# Patient Record
Sex: FEMALE | Race: WHITE | Employment: OTHER | URBAN - METROPOLITAN AREA
[De-identification: names, ages, dates, MRNs, and addresses within clinical notes are randomized per-mention and may not be internally consistent; named-entity substitution may affect disease eponyms.]

---

## 2016-10-27 LAB — GLUCOSE BLD-MCNC: 148 MG/DL

## 2016-10-31 LAB
A/G RATIO: 2.2
ALBUMIN SERPL-MCNC: 4.8 G/DL
ALP BLD-CCNC: 104 U/L
ALT SERPL-CCNC: 17 U/L
AST SERPL-CCNC: 18 U/L
BILIRUB SERPL-MCNC: 0.4 MG/DL (ref 0.1–1.4)
BILIRUBIN DIRECT: 0.1 MG/DL
BILIRUBIN, INDIRECT: 0.3
GLOBULIN: 2.2
PROTEIN TOTAL: 7 G/DL

## 2017-12-19 LAB
BUN BLDV-MCNC: 18 MG/DL
BUN BLDV-MCNC: 18 MG/DL
CALCIUM SERPL-MCNC: 9.9 MG/DL
CALCIUM SERPL-MCNC: 9.9 MG/DL
CHLORIDE BLD-SCNC: 94 MMOL/L
CHLORIDE BLD-SCNC: 94 MMOL/L
CO2: 25 MMOL/L
CO2: 25 MMOL/L
CREAT SERPL-MCNC: 0.8 MG/DL
CREAT SERPL-MCNC: 0.8 MG/DL
GFR CALCULATED: 73
GFR CALCULATED: 73
GLUCOSE BLD-MCNC: 206 MG/DL
GLUCOSE BLD-MCNC: 206 MG/DL
POTASSIUM SERPL-SCNC: 4.3 MMOL/L
POTASSIUM SERPL-SCNC: 4.3 MMOL/L
SODIUM BLD-SCNC: 137 MMOL/L
SODIUM BLD-SCNC: 137 MMOL/L

## 2018-03-19 ENCOUNTER — OFFICE VISIT (OUTPATIENT)
Dept: FAMILY MEDICINE CLINIC | Age: 73
End: 2018-03-19

## 2018-03-19 VITALS
OXYGEN SATURATION: 98 % | DIASTOLIC BLOOD PRESSURE: 82 MMHG | HEART RATE: 71 BPM | SYSTOLIC BLOOD PRESSURE: 136 MMHG | BODY MASS INDEX: 30.29 KG/M2 | HEIGHT: 61 IN | WEIGHT: 160.4 LBS

## 2018-03-19 DIAGNOSIS — F41.9 ANXIETY: ICD-10-CM

## 2018-03-19 DIAGNOSIS — E11.9 TYPE 2 DIABETES MELLITUS WITHOUT COMPLICATION, WITHOUT LONG-TERM CURRENT USE OF INSULIN (HCC): ICD-10-CM

## 2018-03-19 DIAGNOSIS — Z76.89 ENCOUNTER TO ESTABLISH CARE: ICD-10-CM

## 2018-03-19 DIAGNOSIS — I10 ESSENTIAL HYPERTENSION: Primary | ICD-10-CM

## 2018-03-19 PROCEDURE — 99203 OFFICE O/P NEW LOW 30 MIN: CPT | Performed by: FAMILY MEDICINE

## 2018-03-19 RX ORDER — DILTIAZEM HYDROCHLORIDE 240 MG/1
240 CAPSULE, COATED, EXTENDED RELEASE ORAL DAILY
COMMUNITY
End: 2018-06-05 | Stop reason: SDUPTHER

## 2018-03-19 RX ORDER — FLUOXETINE 10 MG/1
CAPSULE ORAL
Refills: 0 | COMMUNITY
Start: 2017-12-18 | End: 2018-07-20 | Stop reason: SDUPTHER

## 2018-03-19 RX ORDER — ASPIRIN 81 MG/1
81 TABLET, CHEWABLE ORAL PRN
COMMUNITY

## 2018-03-19 RX ORDER — MULTIVIT-MIN/FA/LYCOPEN/LUTEIN .4-300-25
1 TABLET ORAL DAILY
COMMUNITY

## 2018-03-19 RX ORDER — AMLODIPINE, VALSARTAN AND HYDROCHLOROTHIAZIDE 10; 320; 25 MG/1; MG/1; MG/1
TABLET ORAL DAILY
COMMUNITY
End: 2018-06-25 | Stop reason: CLARIF

## 2018-03-19 ASSESSMENT — ENCOUNTER SYMPTOMS
EYE ITCHING: 0
VOICE CHANGE: 0
EYE PAIN: 0
NAUSEA: 0
COUGH: 0
CONSTIPATION: 0
SORE THROAT: 0
DIARRHEA: 0
VOMITING: 0
ABDOMINAL PAIN: 0
SHORTNESS OF BREATH: 0
BACK PAIN: 0

## 2018-03-19 ASSESSMENT — PATIENT HEALTH QUESTIONNAIRE - PHQ9
SUM OF ALL RESPONSES TO PHQ QUESTIONS 1-9: 0
2. FEELING DOWN, DEPRESSED OR HOPELESS: 0
SUM OF ALL RESPONSES TO PHQ9 QUESTIONS 1 & 2: 0
1. LITTLE INTEREST OR PLEASURE IN DOING THINGS: 0

## 2018-06-05 DIAGNOSIS — I10 ESSENTIAL HYPERTENSION: Primary | ICD-10-CM

## 2018-06-05 RX ORDER — DILTIAZEM HYDROCHLORIDE 180 MG/1
180 CAPSULE, COATED, EXTENDED RELEASE ORAL DAILY
Qty: 30 CAPSULE | Refills: 5 | Status: SHIPPED | OUTPATIENT
Start: 2018-06-05 | End: 2019-01-02 | Stop reason: SDUPTHER

## 2018-06-25 ENCOUNTER — OFFICE VISIT (OUTPATIENT)
Dept: FAMILY MEDICINE CLINIC | Age: 73
End: 2018-06-25

## 2018-06-25 VITALS
BODY MASS INDEX: 29.27 KG/M2 | HEIGHT: 61 IN | SYSTOLIC BLOOD PRESSURE: 130 MMHG | OXYGEN SATURATION: 98 % | DIASTOLIC BLOOD PRESSURE: 78 MMHG | WEIGHT: 155 LBS | HEART RATE: 77 BPM | RESPIRATION RATE: 16 BRPM

## 2018-06-25 DIAGNOSIS — E11.9 TYPE 2 DIABETES MELLITUS WITHOUT COMPLICATION, WITHOUT LONG-TERM CURRENT USE OF INSULIN (HCC): Primary | ICD-10-CM

## 2018-06-25 DIAGNOSIS — I10 ESSENTIAL HYPERTENSION: ICD-10-CM

## 2018-06-25 DIAGNOSIS — F41.9 ANXIETY: ICD-10-CM

## 2018-06-25 DIAGNOSIS — Z12.11 SCREENING FOR COLON CANCER: ICD-10-CM

## 2018-06-25 LAB
A/G RATIO: 2.3 (ref 1.1–2.2)
ALBUMIN SERPL-MCNC: 4.8 G/DL (ref 3.4–5)
ALP BLD-CCNC: 113 U/L (ref 40–129)
ALT SERPL-CCNC: 15 U/L (ref 10–40)
ANION GAP SERPL CALCULATED.3IONS-SCNC: 17 MMOL/L (ref 3–16)
AST SERPL-CCNC: 15 U/L (ref 15–37)
BILIRUB SERPL-MCNC: 0.3 MG/DL (ref 0–1)
BUN BLDV-MCNC: 17 MG/DL (ref 7–20)
CALCIUM SERPL-MCNC: 10.2 MG/DL (ref 8.3–10.6)
CHLORIDE BLD-SCNC: 100 MMOL/L (ref 99–110)
CO2: 25 MMOL/L (ref 21–32)
CREAT SERPL-MCNC: 0.7 MG/DL (ref 0.6–1.2)
CREATININE URINE POCT: 200
GFR AFRICAN AMERICAN: >60
GFR NON-AFRICAN AMERICAN: >60
GLOBULIN: 2.1 G/DL
GLUCOSE BLD-MCNC: 147 MG/DL (ref 70–99)
HBA1C MFR BLD: 6.8 %
MICROALBUMIN/CREAT 24H UR: 80 MG/G{CREAT}
MICROALBUMIN/CREAT UR-RTO: 30
POTASSIUM SERPL-SCNC: 4.1 MMOL/L (ref 3.5–5.1)
SODIUM BLD-SCNC: 142 MMOL/L (ref 136–145)
TOTAL PROTEIN: 6.9 G/DL (ref 6.4–8.2)

## 2018-06-25 PROCEDURE — 83036 HEMOGLOBIN GLYCOSYLATED A1C: CPT | Performed by: FAMILY MEDICINE

## 2018-06-25 PROCEDURE — 99214 OFFICE O/P EST MOD 30 MIN: CPT | Performed by: FAMILY MEDICINE

## 2018-06-25 PROCEDURE — 82044 UR ALBUMIN SEMIQUANTITATIVE: CPT | Performed by: FAMILY MEDICINE

## 2018-06-25 PROCEDURE — 36415 COLL VENOUS BLD VENIPUNCTURE: CPT | Performed by: FAMILY MEDICINE

## 2018-06-25 RX ORDER — VALSARTAN 320 MG/1
320 TABLET ORAL DAILY
COMMUNITY
End: 2018-06-25 | Stop reason: CLARIF

## 2018-06-25 RX ORDER — VALSARTAN AND HYDROCHLOROTHIAZIDE 320; 25 MG/1; MG/1
1 TABLET, FILM COATED ORAL DAILY
COMMUNITY
End: 2018-08-02 | Stop reason: ALTCHOICE

## 2018-06-25 RX ORDER — TRIAMCINOLONE ACETONIDE 1 MG/G
CREAM TOPICAL
Refills: 0 | COMMUNITY
Start: 2018-03-28 | End: 2019-02-20

## 2018-06-25 RX ORDER — METFORMIN HYDROCHLORIDE 500 MG/1
1000 TABLET, EXTENDED RELEASE ORAL
COMMUNITY
End: 2019-01-11 | Stop reason: SDUPTHER

## 2018-07-20 RX ORDER — FLUOXETINE 10 MG/1
10 CAPSULE ORAL EVERY MORNING
Qty: 30 CAPSULE | Refills: 4 | Status: SHIPPED | OUTPATIENT
Start: 2018-07-20 | End: 2019-01-10 | Stop reason: SDUPTHER

## 2018-08-02 ENCOUNTER — TELEPHONE (OUTPATIENT)
Dept: FAMILY MEDICINE CLINIC | Age: 73
End: 2018-08-02

## 2018-08-02 DIAGNOSIS — I10 ESSENTIAL HYPERTENSION: Primary | ICD-10-CM

## 2018-08-02 RX ORDER — HYDROCHLOROTHIAZIDE 25 MG/1
25 TABLET ORAL DAILY
Qty: 30 TABLET | Refills: 5 | Status: SHIPPED | OUTPATIENT
Start: 2018-08-02 | End: 2021-07-23

## 2018-08-02 RX ORDER — IRBESARTAN 300 MG/1
300 TABLET ORAL NIGHTLY
Qty: 30 TABLET | Refills: 5 | Status: SHIPPED | OUTPATIENT
Start: 2018-08-02 | End: 2019-02-20 | Stop reason: SDUPTHER

## 2018-08-02 NOTE — TELEPHONE ENCOUNTER
Patient called and stated that she received a latter from pharmacy stating that her medication Valsartan-Hydrochlorothiazide has been recalled from pharmacy. Patient would like for a new medication.  Please advise

## 2018-08-09 ENCOUNTER — TELEPHONE (OUTPATIENT)
Dept: FAMILY MEDICINE CLINIC | Age: 73
End: 2018-08-09

## 2019-01-02 DIAGNOSIS — I10 ESSENTIAL HYPERTENSION: ICD-10-CM

## 2019-01-02 RX ORDER — DILTIAZEM HYDROCHLORIDE 180 MG/1
180 CAPSULE, COATED, EXTENDED RELEASE ORAL DAILY
Qty: 30 CAPSULE | Refills: 5 | Status: SHIPPED | OUTPATIENT
Start: 2019-01-02 | End: 2021-11-23

## 2019-01-10 DIAGNOSIS — F41.9 ANXIETY: Primary | ICD-10-CM

## 2019-01-10 RX ORDER — FLUOXETINE 10 MG/1
10 CAPSULE ORAL EVERY MORNING
Qty: 30 CAPSULE | Refills: 1 | Status: SHIPPED | OUTPATIENT
Start: 2019-01-10 | End: 2019-02-20 | Stop reason: SDUPTHER

## 2019-01-11 DIAGNOSIS — E11.9 TYPE 2 DIABETES MELLITUS WITHOUT COMPLICATION, WITHOUT LONG-TERM CURRENT USE OF INSULIN (HCC): Primary | ICD-10-CM

## 2019-01-11 RX ORDER — METFORMIN HYDROCHLORIDE 500 MG/1
1000 TABLET, EXTENDED RELEASE ORAL
Qty: 60 TABLET | Refills: 1 | Status: SHIPPED | OUTPATIENT
Start: 2019-01-11 | End: 2019-02-20 | Stop reason: SDUPTHER

## 2019-02-20 ENCOUNTER — OFFICE VISIT (OUTPATIENT)
Dept: FAMILY MEDICINE CLINIC | Age: 74
End: 2019-02-20
Payer: COMMERCIAL

## 2019-02-20 VITALS
HEART RATE: 69 BPM | RESPIRATION RATE: 12 BRPM | OXYGEN SATURATION: 98 % | DIASTOLIC BLOOD PRESSURE: 74 MMHG | SYSTOLIC BLOOD PRESSURE: 136 MMHG | HEIGHT: 61 IN | BODY MASS INDEX: 28.7 KG/M2 | WEIGHT: 152 LBS

## 2019-02-20 DIAGNOSIS — R10.11 COLICKY RUQ ABDOMINAL PAIN: ICD-10-CM

## 2019-02-20 DIAGNOSIS — Z00.00 BLOOD TESTS FOR ROUTINE GENERAL PHYSICAL EXAMINATION: ICD-10-CM

## 2019-02-20 DIAGNOSIS — E11.9 TYPE 2 DIABETES MELLITUS WITHOUT COMPLICATION, WITHOUT LONG-TERM CURRENT USE OF INSULIN (HCC): Primary | ICD-10-CM

## 2019-02-20 DIAGNOSIS — F41.9 ANXIETY: ICD-10-CM

## 2019-02-20 DIAGNOSIS — I10 ESSENTIAL HYPERTENSION: ICD-10-CM

## 2019-02-20 DIAGNOSIS — Z13.220 SCREENING FOR HYPERLIPIDEMIA: ICD-10-CM

## 2019-02-20 LAB — HBA1C MFR BLD: 7.8 %

## 2019-02-20 PROCEDURE — 99214 OFFICE O/P EST MOD 30 MIN: CPT | Performed by: FAMILY MEDICINE

## 2019-02-20 PROCEDURE — 83036 HEMOGLOBIN GLYCOSYLATED A1C: CPT | Performed by: FAMILY MEDICINE

## 2019-02-20 RX ORDER — METFORMIN HYDROCHLORIDE 500 MG/1
1000 TABLET, EXTENDED RELEASE ORAL
Qty: 90 TABLET | Refills: 5 | Status: SHIPPED | OUTPATIENT
Start: 2019-02-20

## 2019-02-20 RX ORDER — FLUOXETINE 10 MG/1
10 CAPSULE ORAL EVERY MORNING
Qty: 30 CAPSULE | Refills: 5 | Status: SHIPPED | OUTPATIENT
Start: 2019-02-20

## 2019-02-20 RX ORDER — IRBESARTAN 300 MG/1
300 TABLET ORAL NIGHTLY
Qty: 30 TABLET | Refills: 5 | Status: SHIPPED | OUTPATIENT
Start: 2019-02-20

## 2019-02-20 ASSESSMENT — PATIENT HEALTH QUESTIONNAIRE - PHQ9
2. FEELING DOWN, DEPRESSED OR HOPELESS: 0
1. LITTLE INTEREST OR PLEASURE IN DOING THINGS: 0
SUM OF ALL RESPONSES TO PHQ QUESTIONS 1-9: 0
SUM OF ALL RESPONSES TO PHQ9 QUESTIONS 1 & 2: 0
SUM OF ALL RESPONSES TO PHQ QUESTIONS 1-9: 0

## 2019-02-22 PROBLEM — R10.11 COLICKY RUQ ABDOMINAL PAIN: Status: ACTIVE | Noted: 2019-02-22

## 2019-02-27 DIAGNOSIS — R10.11 COLICKY RUQ ABDOMINAL PAIN: ICD-10-CM

## 2019-02-28 ENCOUNTER — TELEPHONE (OUTPATIENT)
Dept: FAMILY MEDICINE CLINIC | Age: 74
End: 2019-02-28

## 2019-03-06 PROBLEM — K80.10 CHRONIC CALCULOUS CHOLECYSTITIS: Status: ACTIVE | Noted: 2019-03-06

## 2019-04-21 ENCOUNTER — HOSPITAL ENCOUNTER (EMERGENCY)
Age: 74
Discharge: HOME | End: 2019-04-21
Payer: COMMERCIAL

## 2019-04-21 ENCOUNTER — APPOINTMENT (OUTPATIENT)
Dept: GENERAL RADIOLOGY | Age: 74
End: 2019-04-21
Payer: COMMERCIAL

## 2019-04-21 VITALS
HEIGHT: 60 IN | OXYGEN SATURATION: 100 % | WEIGHT: 145 LBS | HEART RATE: 83 BPM | BODY MASS INDEX: 28.47 KG/M2 | SYSTOLIC BLOOD PRESSURE: 219 MMHG | TEMPERATURE: 98.4 F | RESPIRATION RATE: 16 BRPM | DIASTOLIC BLOOD PRESSURE: 95 MMHG

## 2019-04-21 DIAGNOSIS — S61.212A LACERATION OF RIGHT MIDDLE FINGER WITHOUT FOREIGN BODY WITHOUT DAMAGE TO NAIL, INITIAL ENCOUNTER: ICD-10-CM

## 2019-04-21 DIAGNOSIS — M79.641 RIGHT HAND PAIN: Primary | ICD-10-CM

## 2019-04-21 PROCEDURE — 99283 EMERGENCY DEPT VISIT LOW MDM: CPT

## 2019-04-21 PROCEDURE — 73130 X-RAY EXAM OF HAND: CPT

## 2019-04-21 RX ORDER — CEPHALEXIN 500 MG/1
500 CAPSULE ORAL 3 TIMES DAILY
Qty: 21 CAPSULE | Refills: 0 | Status: SHIPPED | OUTPATIENT
Start: 2019-04-21 | End: 2019-04-28

## 2019-04-21 RX ORDER — NAPROXEN 375 MG/1
375 TABLET ORAL 2 TIMES DAILY PRN
Qty: 20 TABLET | Refills: 0 | Status: SHIPPED | OUTPATIENT
Start: 2019-04-21 | End: 2021-07-23

## 2019-04-21 ASSESSMENT — PAIN SCALES - GENERAL: PAINLEVEL_OUTOF10: 7

## 2019-04-21 NOTE — ED NOTES
Discharge instructions reviewed with patient. PT verbalizes understanding. All questions answered. Follow up instructions given. PT denies any further needs at this time.       Laurita Montgomery [place]  08/95/42 0782
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
## 2022-07-24 ENCOUNTER — APPOINTMENT (OUTPATIENT)
Dept: GENERAL RADIOLOGY | Age: 77
End: 2022-07-24
Payer: COMMERCIAL

## 2022-07-24 ENCOUNTER — HOSPITAL ENCOUNTER (EMERGENCY)
Age: 77
Discharge: HOME | End: 2022-07-24
Attending: EMERGENCY MEDICINE
Payer: COMMERCIAL

## 2022-07-24 VITALS
SYSTOLIC BLOOD PRESSURE: 161 MMHG | OXYGEN SATURATION: 97 % | HEART RATE: 75 BPM | RESPIRATION RATE: 22 BRPM | TEMPERATURE: 97.8 F | DIASTOLIC BLOOD PRESSURE: 90 MMHG

## 2022-07-24 DIAGNOSIS — N17.9 AKI (ACUTE KIDNEY INJURY) (HCC): ICD-10-CM

## 2022-07-24 DIAGNOSIS — R55 SYNCOPE AND COLLAPSE: Primary | ICD-10-CM

## 2022-07-24 DIAGNOSIS — I95.1 ORTHOSTASIS: ICD-10-CM

## 2022-07-24 LAB
ALBUMIN SERPL-MCNC: 4.7 GM/DL (ref 3.4–5)
ALP BLD-CCNC: 126 IU/L (ref 40–129)
ALT SERPL-CCNC: 16 U/L (ref 10–40)
ANION GAP SERPL CALCULATED.3IONS-SCNC: 15 MMOL/L (ref 4–16)
AST SERPL-CCNC: 16 IU/L (ref 15–37)
BACTERIA: NEGATIVE /HPF
BASOPHILS ABSOLUTE: 0.1 K/CU MM
BASOPHILS RELATIVE PERCENT: 0.5 % (ref 0–1)
BILIRUB SERPL-MCNC: 0.6 MG/DL (ref 0–1)
BILIRUBIN URINE: NEGATIVE MG/DL
BLOOD, URINE: NEGATIVE
BUN BLDV-MCNC: 22 MG/DL (ref 6–23)
CALCIUM SERPL-MCNC: 9.9 MG/DL (ref 8.3–10.6)
CHLORIDE BLD-SCNC: 98 MMOL/L (ref 99–110)
CLARITY: CLEAR
CO2: 27 MMOL/L (ref 21–32)
COLOR: YELLOW
CREAT SERPL-MCNC: 1.4 MG/DL (ref 0.6–1.1)
DIFFERENTIAL TYPE: ABNORMAL
EOSINOPHILS ABSOLUTE: 0.1 K/CU MM
EOSINOPHILS RELATIVE PERCENT: 0.6 % (ref 0–3)
GFR AFRICAN AMERICAN: 44 ML/MIN/1.73M2
GFR NON-AFRICAN AMERICAN: 36 ML/MIN/1.73M2
GLUCOSE BLD-MCNC: 345 MG/DL (ref 70–99)
GLUCOSE BLD-MCNC: 397 MG/DL
GLUCOSE BLD-MCNC: 397 MG/DL (ref 70–99)
GLUCOSE, URINE: >1000 MG/DL
HCT VFR BLD CALC: 43 % (ref 37–47)
HEMOGLOBIN: 15.1 GM/DL (ref 12.5–16)
HYALINE CASTS: 0 /LPF
IMMATURE NEUTROPHIL %: 0.4 % (ref 0–0.43)
KETONES, URINE: ABNORMAL MG/DL
LEUKOCYTE ESTERASE, URINE: ABNORMAL
LYMPHOCYTES ABSOLUTE: 1.5 K/CU MM
LYMPHOCYTES RELATIVE PERCENT: 14.6 % (ref 24–44)
MAGNESIUM: 1.8 MG/DL (ref 1.8–2.4)
MCH RBC QN AUTO: 31.2 PG (ref 27–31)
MCHC RBC AUTO-ENTMCNC: 35.1 % (ref 32–36)
MCV RBC AUTO: 88.8 FL (ref 78–100)
MONOCYTES ABSOLUTE: 0.8 K/CU MM
MONOCYTES RELATIVE PERCENT: 8.3 % (ref 0–4)
MUCUS: ABNORMAL HPF
NITRITE URINE, QUANTITATIVE: NEGATIVE
NUCLEATED RBC %: 0 %
PDW BLD-RTO: 12.7 % (ref 11.7–14.9)
PH, URINE: 6 (ref 5–8)
PLATELET # BLD: 330 K/CU MM (ref 140–440)
PMV BLD AUTO: 10 FL (ref 7.5–11.1)
POTASSIUM SERPL-SCNC: 4.4 MMOL/L (ref 3.5–5.1)
PRO-BNP: 223.8 PG/ML
PROTEIN UA: ABNORMAL MG/DL
RBC # BLD: 4.84 M/CU MM (ref 4.2–5.4)
RBC URINE: ABNORMAL /HPF (ref 0–6)
SEGMENTED NEUTROPHILS ABSOLUTE COUNT: 7.7 K/CU MM
SEGMENTED NEUTROPHILS RELATIVE PERCENT: 75.6 % (ref 36–66)
SODIUM BLD-SCNC: 140 MMOL/L (ref 135–145)
SPECIFIC GRAVITY UA: 1.01 (ref 1–1.03)
TOTAL CK: 76 IU/L (ref 26–140)
TOTAL IMMATURE NEUTOROPHIL: 0.04 K/CU MM
TOTAL NUCLEATED RBC: 0 K/CU MM
TOTAL PROTEIN: 6.9 GM/DL (ref 6.4–8.2)
TROPONIN T: <0.01 NG/ML
UROBILINOGEN, URINE: NORMAL MG/DL (ref 0.2–1)
WBC # BLD: 10.1 K/CU MM (ref 4–10.5)
WBC UA: 6 /HPF (ref 0–5)

## 2022-07-24 PROCEDURE — 80053 COMPREHEN METABOLIC PANEL: CPT

## 2022-07-24 PROCEDURE — 2580000003 HC RX 258: Performed by: PHYSICIAN ASSISTANT

## 2022-07-24 PROCEDURE — 82550 ASSAY OF CK (CPK): CPT

## 2022-07-24 PROCEDURE — 83735 ASSAY OF MAGNESIUM: CPT

## 2022-07-24 PROCEDURE — 81001 URINALYSIS AUTO W/SCOPE: CPT

## 2022-07-24 PROCEDURE — 71045 X-RAY EXAM CHEST 1 VIEW: CPT

## 2022-07-24 PROCEDURE — 83880 ASSAY OF NATRIURETIC PEPTIDE: CPT

## 2022-07-24 PROCEDURE — 82962 GLUCOSE BLOOD TEST: CPT

## 2022-07-24 PROCEDURE — 85025 COMPLETE CBC W/AUTO DIFF WBC: CPT

## 2022-07-24 PROCEDURE — 99285 EMERGENCY DEPT VISIT HI MDM: CPT

## 2022-07-24 PROCEDURE — 84484 ASSAY OF TROPONIN QUANT: CPT

## 2022-07-24 PROCEDURE — 93005 ELECTROCARDIOGRAM TRACING: CPT | Performed by: PHYSICIAN ASSISTANT

## 2022-07-24 RX ORDER — 0.9 % SODIUM CHLORIDE 0.9 %
1000 INTRAVENOUS SOLUTION INTRAVENOUS ONCE
Status: COMPLETED | OUTPATIENT
Start: 2022-07-24 | End: 2022-07-24

## 2022-07-24 RX ADMIN — SODIUM CHLORIDE 1000 ML: 9 INJECTION, SOLUTION INTRAVENOUS at 15:16

## 2022-07-24 ASSESSMENT — PAIN - FUNCTIONAL ASSESSMENT: PAIN_FUNCTIONAL_ASSESSMENT: NONE - DENIES PAIN

## 2022-07-24 NOTE — ED NOTES
Pt arrived via ems with c/o a syncope episode. Pt states [place] the [place] since 1100 and she felt off, fatigued, and very hot. After she sat down, she passed out. Pt did not fall or hit her head, she leaned to the side and her family caught her.       Roxana Pike RN  07/24/22 9954

## 2022-07-24 NOTE — ED NOTES
Report received from [address] [place] [address]. All questions answered at this time.      Hailee Lopez RN  07/24/22 8072

## 2022-07-24 NOTE — ED PROVIDER NOTES
EMERGENCY DEPARTMENT ENCOUNTER    [facility] EMERGENCY DEPARTMENT        TRIAGE CHIEF COMPLAINT:   Fatigue and Loss of Consciousness      [place]:  Mariajose Wells is a 67 y.o. female that presents via EMS with family with concern for generalized fatigue and syncopal episode. Onset was just prior to ED arrival.  Per family at bedside, patient had been at the [facility] fair since 11:30 AM.  They had just eaten lunch and she was seated with her son outside one of the boards. When patient stood up from seated position she began complaining of \"not feeling well\" as well as nausea. Family states that her eyes closed and she began to list to the left side but was caught by family member. She was questioned by bystanders but was having difficulty answering questions no reported slurred speech, facial droop or unilateral extremity weakness. There was a [facility] volunteer nurse present at the [facility] that did complete a NIHSS and patient was 0 at that time. She was transported by EMS to ED and at ED presentation, she is now awake and alert and answering questions normally. Family does state that she has a history of TIA and hypertension. Patient states that she has had similar syncopal episodes in the past.  Does state that she felt herself \"about to pass out\" when she felt hot and sweaty all over without chest pain palpitations headache. She is currently asymptomatic for any complaints including chest pain shortness of breath palpitations nausea vomiting or headache. Denying any numbness tingling or weakness in the upper or lower extremities. Does have history of diabetes, on oral metformin only. Prior to today, patient was at baseline state of health. No recent urinary symptoms.     ROS:  General:  No fevers, no chills, See HPI  Cardiovascular:  No chest pain, no palpitations  Respiratory:  No shortness of breath, no cough, no wheezing  Gastrointestinal:  No pain, no nausea, no vomiting, no diarrhea  Musculoskeletal:  No muscle pain, no joint pain  Skin:  No rash, no pruritis, no easy bruising  Neurologic:  No speech problems, no headache, no extremity numbness, no extremity tingling, no extremity weakness  Psychiatric:  No anxiety  Genitourinary:  No dysuria, no hematuria  Endocrine:  No unexpected weight gain, no unexpected weight loss  Extremities:  No edema    Past Medical History:   Diagnosis Date    Anxiety      has OCD. He had a stroke.       Diabetes mellitus ([facility] [address]     Hypertension      Past Surgical History:   Procedure Laterality Date    CHOLECYSTECTOMY      WISDOM TOOTH EXTRACTION      all over the years     Family History   Problem Relation Age of Onset    Cancer Mother     Cancer Father     Cancer Sister      Social History     Socioeconomic History    Marital status:      Spouse name: Not on file    Number of children: Not on file    Years of education: Not on file    Highest education level: Not on file   Occupational History    Not on file   Tobacco Use    Smoking status: Never    Smokeless tobacco: Never   Vaping Use    Vaping Use: Never used   Substance and Sexual Activity    Alcohol use: No    Drug use: No    Sexual activity: Not on file   Other Topics Concern    Not on file   Social History Narrative    Not on file     Social Determinants of Health     Financial Resource Strain: Not on file   Food Insecurity: Not on file   Transportation Needs: Not on file   Physical Activity: Not on file   Stress: Not on file   Social Connections: Not on file   Intimate Partner Violence: Not on file   Housing Stability: Not on file     Current Facility-Administered Medications   Medication Dose Route Frequency Provider Last Rate Last Admin    0.9 % sodium chloride bolus  1,000 mL IntraVENous Once Stanley Lan PA-C 1,000 mL/hr at 07/24/22 1516 1,000 mL at 07/24/22 1516     Current Outpatient Medications   Medication Sig Dispense Refill    chlorthalidone (HYGROTON) 25 MG tablet Take 1 tablet by mouth daily 30 tablet 3    amLODIPine (NORVASC) 10 MG tablet Take 1 tablet by mouth daily 30 tablet 3    glimepiride (AMARYL) 1 MG tablet Take 1 mg by mouth every morning (before breakfast)      spironolactone (ALDACTONE) 25 MG tablet Take 1 tablet by mouth daily 30 tablet 3    metFORMIN (GLUCOPHAGE-XR) 500 MG extended release tablet Take 2 tablets by mouth daily (with breakfast) And one in the evening. (Patient taking differently: Take 1,000 mg by mouth 2 times daily ) 90 tablet 5    irbesartan (AVAPRO) 300 MG tablet Take 1 tablet by mouth nightly 30 tablet 5    FLUoxetine (PROZAC) 10 MG capsule Take 1 capsule by mouth every morning 30 capsule 5    aspirin 81 MG chewable tablet Take 81 mg by mouth as needed       Multiple Vitamins-Minerals (CENTRUM SILVER ADULT 50+) TABS Take 1 tablet by mouth daily       Allergies   Allergen Reactions    Statins Other (See Comments)     Nausea, unsteady feeling, restless legs. Nursing Notes Reviewed  PHYSICAL EXAM    VITAL SIGNS: BP (!) 145/79   Pulse 95   Temp 97.8 °F (36.6 °C) (Oral)   Resp 22   SpO2 95%    Constitutional:  Well developed, Well nourished, In no acute distress  Head:  Normocephalic, Atraumatic  Eyes: PERRL. EOMI. no nystagmus. Sclera clear. Conjunctiva normal, No discharge. Neck/Lymphatics: Supple, no JVD, no nuchal rigidity. No carotid bruits. Cardiovascular:  RRR, Normal S1 & S2  No murmurs  Peripheral Vascular: Distal pulses 2+, Capillary refill <2seconds  Respiratory:  Respirations nonnlabored, Clear to auscultation bilaterally, No retractions  Abdomen: Bowel sounds normal in all quadrants, Soft, Non tender/Nondistended, No palpable abdominal masses. Musculoskeletal: No edema, No tenderness, No cyanosis, 5/5 strength bilaterally, BUE/BLE symmetrical without atrophy or deformities  Integument:  Warm, Dry, Intact, Skin turgor and texture normal  Neurologic:  Alert & oriented x3 , No focal deficits noted.    Cranial nerves II through XII grossly intact. No slurred speech. No facial droop. Finger to nose intact, rapid alternating movements intact. Normal gross motor coordination & motor strength bilateral upper and lower extremities.    Patient Stroke Scale at 1500 is:  Level of Consciousness:  0 - alert; keenly responsive    LOC Questions:  0 - answers both questions correctly    LOC Commands:  0 - performs both tasks correctly    Best Gaze:  0 - normal    Visual Fields:  0 - no visual loss    Facial Palsy:  0 - normal symmetric movement    Motor-Arm-Left:  0 - no drift, limb holds 90 (or 45) degrees for full 10 seconds    Motor-Leg-Left:  0 - no drift; leg holds 30 degree position for full 5 seconds    Motor-Arm-Right:  0 - no drift, limb holds 90 (or 45) degrees for full 10 seconds    Motor-Leg-Right:  0 - no drift; leg holds 30 degree position for full 5 seconds    Limb Ataxia:  0 - absent    Sensory:  0 - normal; no sensory loss    Best Language:  0 - no aphasia, normal    Dysarthria:  0 - normal    Psychiatric:  Affect appropriate      I have reviewed and interpreted all of the currently available lab results from this visit (if applicable):  Results for orders placed or performed during the hospital encounter of 07/24/22   POC Blood Glucose   Result Value Ref Range    Glucose 397 mg/dL   POCT Glucose   Result Value Ref Range    POC Glucose 397 (H) 70 - 99 MG/DL   EKG 12 Lead   Result Value Ref Range    Ventricular Rate 78 BPM    Atrial Rate 78 BPM    P-R Interval 140 ms    QRS Duration 78 ms    Q-T Interval 384 ms    QTc Calculation (Bazett) 437 ms    P Axis 14 degrees    R Axis -1 degrees    T Axis 23 degrees    Diagnosis       Normal sinus rhythm  Inferior infarct , age undetermined  Abnormal ECG  No previous ECGs available          Radiographs (if obtained):  [] The following radiograph was interpreted by myself in the absence of a radiologist:   [] Radiologist's Report Reviewed:  XR CHEST PORTABLE   Final Result   No acute process. EKG Interpretation  Please see ED physician's note - Dr. Osbaldo Metzger - for EKG interpretation        Chart review shows recent radiographs:  No results found. ED COURSE & MEDICAL DECISION MAKING       Vital signs and nursing notes reviewed during ED course. I have independently evaluated this patient . Supervising physician - Dr. Osbaldo Metzger - was present in ED and available for consultation throughout entirety of patient's care. All pertinent Lab data and radiographic results reviewed with patient at bedside. The patient and/or the family were informed of the results of any tests/labs/imaging, the treatment plan, and time was allotted to answer questions. Clinical Impression:  1. Syncope and collapse        Patient presents via EMS from [facility] for syncopal episode. My exam, patient is awake and alert x3, following all commands, smiling pleasant interactive. She is back to normal baseline mental status per family numbers at bedside. Per EMS report, SAMIR C nurse at site at the [facility] did perform NIHSS which was 0 at that time. Repeat stroke scale upon ED arrival is 0. She is answering all questions appropriately, equal strength range of motion station the bilateral lower extremities. Unremarkable cardiopulmonary exam.  Abdomen soft nontender. Stroke alert was not initiated as patient has had resolution of symptoms. Patient placed on telemetry continuous pulse ox monitoring. Start IV fluids. Accu-Chek is 397. No acute ischemic change on EKG. Chest x-ray is also negative for evidence of acute cardiopulmonary process. Pending at time of dictation, awaiting labs    I did discuss this patient's history, ED presentation/course with my attending physician - Dr. Osbaldo Metzger. Final disposition, clinical impression, interpretation of labs/imaging  were made by attending physician.   Please see his/her note for additional details of their evaluation. Disposition referral (if applicable):  No follow-up provider specified.     Disposition medications (if applicable):  New Prescriptions    No medications on file         (Please note that portions of this note may have been completed with a voice recognition program. Efforts were made to edit the dictations but occasionally words are mis-transcribed.)          Shawn Bazan PA-C  07/24/22 3211

## 2022-07-25 NOTE — ED PROVIDER NOTES
I independently examined and evaluated Katt Swanson. In brief, 79-year-old female presents after syncopal event. Describes that she had eaten, stood up and then immediately began to feel unwell. Describes an episode of near syncope did not hit her head or otherwise injure herself. No chest pain, shortness of breath. Focused exam revealed the patient appears well-hydrated, well-nourished. Mucous membranes are moist. Speech is clear. Breathing is unlabored. Skin is dry. Mental status is normal. The patient moves all extremities and is without facial droop. No lower extremity tenderness, edema or asymmetry. ED course: Patient is neurologically intact at the scene as well as on arrival here. Symptoms are concerning for orthostatic hypotension. She has been given fluids with improvement. Indicates she feels well on repeat assessment. Labs are notable for mild RYAN. Patient does follow closely with Dr. Chelita Maynard. I discussed with him. He agrees with plan to hydrate the patient. Also recommends close follow-up with repeat BMP prior to follow-up appointment. Advises that patient hold spironolactone until follow-up. Discussed this at length with the patient and her family. They indicate understanding of all instructions. Patient is given instructions regarding symptomatic care at home as well as return precautions. To call nephro and PCP for follow up in 2-3 days. Patient verbalizes understanding of all instructions and is comfortable with the plan of care. Final Impression:  1. Syncope and collapse    2. Orthostasis    3. RYAN (acute kidney injury) [facility])      DISPOSITION Decision To Discharge 07/24/2022 08:53:15 PM      All diagnostic, treatment, and disposition decisions were made by myself in conjunction with the advanced practice provider. For all further details of the patient's emergency department visit, please see the advanced practice provider's documentation.     Comment: Please note this report has been produced using speech recognition software and may contain errors related to that system including errors in grammar, punctuation, and spelling, as well as words and phrases that may be inappropriate. If there are any questions or concerns please feel free to contact the dictating provider for clarification.         Ric Rosales,   07/25/22 6726

## 2022-07-25 NOTE — DISCHARGE INSTRUCTIONS
Please hold your spironolactone (Aldactone) until instructed otherwise by Dr. Toribio Marte. Call tomorrow to schedule a follow-up appointment with Dr. Toribio Marte this week. Please have your blood drawn before this appointment.

## 2022-07-25 NOTE — ED NOTES
Reviewed discharge information. Patient verbalized understanding of paperwork, medication, and care instructions. Patient denied any questions.       Rossi Lizama RN  07/24/22 8079

## 2022-07-26 DIAGNOSIS — N17.9 AKI (ACUTE KIDNEY INJURY) (HCC): ICD-10-CM

## 2022-07-26 LAB
ANION GAP SERPL CALCULATED.3IONS-SCNC: 13 MMOL/L (ref 3–16)
BUN BLDV-MCNC: 18 MG/DL (ref 7–20)
CALCIUM SERPL-MCNC: 10.1 MG/DL (ref 8.3–10.6)
CHLORIDE BLD-SCNC: 98 MMOL/L (ref 99–110)
CO2: 28 MMOL/L (ref 21–32)
CREAT SERPL-MCNC: 0.8 MG/DL (ref 0.6–1.2)
EKG ATRIAL RATE: 78 BPM
EKG DIAGNOSIS: NORMAL
EKG P AXIS: 14 DEGREES
EKG P-R INTERVAL: 140 MS
EKG Q-T INTERVAL: 384 MS
EKG QRS DURATION: 78 MS
EKG QTC CALCULATION (BAZETT): 437 MS
EKG R AXIS: -1 DEGREES
EKG T AXIS: 23 DEGREES
EKG VENTRICULAR RATE: 78 BPM
GFR AFRICAN AMERICAN: >60
GFR NON-AFRICAN AMERICAN: >60
GLUCOSE BLD-MCNC: 148 MG/DL (ref 70–99)
POTASSIUM SERPL-SCNC: 4.2 MMOL/L (ref 3.5–5.1)
SODIUM BLD-SCNC: 139 MMOL/L (ref 136–145)

## 2022-07-26 PROCEDURE — 93010 ELECTROCARDIOGRAM REPORT: CPT | Performed by: INTERNAL MEDICINE

## 2022-11-12 ENCOUNTER — APPOINTMENT (OUTPATIENT)
Dept: MRI IMAGING | Age: 77
DRG: 065 | End: 2022-11-12
Payer: COMMERCIAL

## 2022-11-12 ENCOUNTER — APPOINTMENT (OUTPATIENT)
Dept: GENERAL RADIOLOGY | Age: 77
DRG: 065 | End: 2022-11-12
Payer: COMMERCIAL

## 2022-11-12 ENCOUNTER — APPOINTMENT (OUTPATIENT)
Dept: CT IMAGING | Age: 77
DRG: 065 | End: 2022-11-12
Payer: COMMERCIAL

## 2022-11-12 ENCOUNTER — HOSPITAL ENCOUNTER (INPATIENT)
Age: 77
LOS: 3 days | Discharge: HOME | DRG: 065 | End: 2022-11-15
Attending: EMERGENCY MEDICINE | Admitting: STUDENT IN AN ORGANIZED HEALTH CARE EDUCATION/TRAINING PROGRAM
Payer: COMMERCIAL

## 2022-11-12 DIAGNOSIS — I63.9 CEREBROVASCULAR ACCIDENT (CVA), UNSPECIFIED MECHANISM (HCC): Primary | ICD-10-CM

## 2022-11-12 DIAGNOSIS — E11.9 TYPE 2 DIABETES MELLITUS WITHOUT COMPLICATION, WITHOUT LONG-TERM CURRENT USE OF INSULIN (HCC): ICD-10-CM

## 2022-11-12 PROBLEM — R29.90 STROKE-LIKE SYMPTOMS: Status: ACTIVE | Noted: 2022-11-12

## 2022-11-12 LAB
ALBUMIN SERPL-MCNC: 4.4 GM/DL (ref 3.4–5)
ALP BLD-CCNC: 98 IU/L (ref 40–129)
ALT SERPL-CCNC: 14 U/L (ref 10–40)
ANION GAP SERPL CALCULATED.3IONS-SCNC: 13 MMOL/L (ref 4–16)
APTT: 24 SECONDS (ref 25.1–37.1)
AST SERPL-CCNC: 15 IU/L (ref 15–37)
BASOPHILS ABSOLUTE: 0 K/CU MM
BASOPHILS RELATIVE PERCENT: 0.5 % (ref 0–1)
BILIRUB SERPL-MCNC: 0.4 MG/DL (ref 0–1)
BUN BLDV-MCNC: 15 MG/DL (ref 6–23)
CALCIUM SERPL-MCNC: 9.6 MG/DL (ref 8.3–10.6)
CHLORIDE BLD-SCNC: 98 MMOL/L (ref 99–110)
CO2: 24 MMOL/L (ref 21–32)
CREAT SERPL-MCNC: 0.7 MG/DL (ref 0.6–1.1)
DIFFERENTIAL TYPE: ABNORMAL
EKG ATRIAL RATE: 67 BPM
EKG DIAGNOSIS: NORMAL
EKG P AXIS: 53 DEGREES
EKG P-R INTERVAL: 126 MS
EKG Q-T INTERVAL: 404 MS
EKG QRS DURATION: 82 MS
EKG QTC CALCULATION (BAZETT): 426 MS
EKG R AXIS: 9 DEGREES
EKG T AXIS: 49 DEGREES
EKG VENTRICULAR RATE: 67 BPM
EOSINOPHILS ABSOLUTE: 0.1 K/CU MM
EOSINOPHILS RELATIVE PERCENT: 1.1 % (ref 0–3)
GFR SERPL CREATININE-BSD FRML MDRD: >60 ML/MIN/1.73M2
GLUCOSE BLD-MCNC: 120 MG/DL (ref 70–99)
GLUCOSE BLD-MCNC: 127 MG/DL (ref 70–99)
GLUCOSE BLD-MCNC: 166 MG/DL (ref 70–99)
GLUCOSE BLD-MCNC: 181 MG/DL (ref 70–99)
HCT VFR BLD CALC: 41.4 % (ref 37–47)
HEMOGLOBIN: 14.3 GM/DL (ref 12.5–16)
IMMATURE NEUTROPHIL %: 0.2 % (ref 0–0.43)
INR BLD: 0.88 INDEX
LYMPHOCYTES ABSOLUTE: 1.4 K/CU MM
LYMPHOCYTES RELATIVE PERCENT: 17.3 % (ref 24–44)
MCH RBC QN AUTO: 31.3 PG (ref 27–31)
MCHC RBC AUTO-ENTMCNC: 34.5 % (ref 32–36)
MCV RBC AUTO: 90.6 FL (ref 78–100)
MONOCYTES ABSOLUTE: 0.6 K/CU MM
MONOCYTES RELATIVE PERCENT: 7.4 % (ref 0–4)
NUCLEATED RBC %: 0 %
PDW BLD-RTO: 12.3 % (ref 11.7–14.9)
PLATELET # BLD: 334 K/CU MM (ref 140–440)
PMV BLD AUTO: 9.3 FL (ref 7.5–11.1)
POTASSIUM SERPL-SCNC: 3.8 MMOL/L (ref 3.5–5.1)
PROTHROMBIN TIME: 11.3 SECONDS (ref 11.7–14.5)
RBC # BLD: 4.57 M/CU MM (ref 4.2–5.4)
SEGMENTED NEUTROPHILS ABSOLUTE COUNT: 6 K/CU MM
SEGMENTED NEUTROPHILS RELATIVE PERCENT: 73.5 % (ref 36–66)
SODIUM BLD-SCNC: 135 MMOL/L (ref 135–145)
TOTAL IMMATURE NEUTOROPHIL: 0.02 K/CU MM
TOTAL NUCLEATED RBC: 0 K/CU MM
TOTAL PROTEIN: 7.2 GM/DL (ref 6.4–8.2)
TROPONIN T: <0.01 NG/ML
WBC # BLD: 8.1 K/CU MM (ref 4–10.5)

## 2022-11-12 PROCEDURE — 85730 THROMBOPLASTIN TIME PARTIAL: CPT

## 2022-11-12 PROCEDURE — 82962 GLUCOSE BLOOD TEST: CPT

## 2022-11-12 PROCEDURE — 6370000000 HC RX 637 (ALT 250 FOR IP): Performed by: STUDENT IN AN ORGANIZED HEALTH CARE EDUCATION/TRAINING PROGRAM

## 2022-11-12 PROCEDURE — 6370000000 HC RX 637 (ALT 250 FOR IP)

## 2022-11-12 PROCEDURE — 80053 COMPREHEN METABOLIC PANEL: CPT

## 2022-11-12 PROCEDURE — 6360000002 HC RX W HCPCS

## 2022-11-12 PROCEDURE — G0378 HOSPITAL OBSERVATION PER HR: HCPCS

## 2022-11-12 PROCEDURE — 85610 PROTHROMBIN TIME: CPT

## 2022-11-12 PROCEDURE — 93010 ELECTROCARDIOGRAM REPORT: CPT | Performed by: INTERNAL MEDICINE

## 2022-11-12 PROCEDURE — 71045 X-RAY EXAM CHEST 1 VIEW: CPT

## 2022-11-12 PROCEDURE — 70450 CT HEAD/BRAIN W/O DYE: CPT

## 2022-11-12 PROCEDURE — 70498 CT ANGIOGRAPHY NECK: CPT

## 2022-11-12 PROCEDURE — 99285 EMERGENCY DEPT VISIT HI MDM: CPT

## 2022-11-12 PROCEDURE — 93005 ELECTROCARDIOGRAM TRACING: CPT | Performed by: EMERGENCY MEDICINE

## 2022-11-12 PROCEDURE — 85025 COMPLETE CBC W/AUTO DIFF WBC: CPT

## 2022-11-12 PROCEDURE — 96372 THER/PROPH/DIAG INJ SC/IM: CPT

## 2022-11-12 PROCEDURE — 6360000004 HC RX CONTRAST MEDICATION: Performed by: EMERGENCY MEDICINE

## 2022-11-12 PROCEDURE — 2500000003 HC RX 250 WO HCPCS: Performed by: EMERGENCY MEDICINE

## 2022-11-12 PROCEDURE — 84484 ASSAY OF TROPONIN QUANT: CPT

## 2022-11-12 PROCEDURE — 1200000000 HC SEMI PRIVATE

## 2022-11-12 PROCEDURE — 70551 MRI BRAIN STEM W/O DYE: CPT

## 2022-11-12 PROCEDURE — 96375 TX/PRO/DX INJ NEW DRUG ADDON: CPT

## 2022-11-12 PROCEDURE — 94761 N-INVAS EAR/PLS OXIMETRY MLT: CPT

## 2022-11-12 RX ORDER — ONDANSETRON 4 MG/1
4 TABLET, ORALLY DISINTEGRATING ORAL EVERY 8 HOURS PRN
Status: DISCONTINUED | OUTPATIENT
Start: 2022-11-12 | End: 2022-11-15 | Stop reason: HOSPADM

## 2022-11-12 RX ORDER — CHLORTHALIDONE 25 MG/1
25 TABLET ORAL DAILY
Status: DISCONTINUED | OUTPATIENT
Start: 2022-11-12 | End: 2022-11-12

## 2022-11-12 RX ORDER — LOSARTAN POTASSIUM 25 MG/1
25 TABLET ORAL DAILY
Status: DISCONTINUED | OUTPATIENT
Start: 2022-11-12 | End: 2022-11-14

## 2022-11-12 RX ORDER — ENOXAPARIN SODIUM 100 MG/ML
40 INJECTION SUBCUTANEOUS DAILY
Status: DISCONTINUED | OUTPATIENT
Start: 2022-11-12 | End: 2022-11-15 | Stop reason: HOSPADM

## 2022-11-12 RX ORDER — INSULIN LISPRO 100 [IU]/ML
0-4 INJECTION, SOLUTION INTRAVENOUS; SUBCUTANEOUS NIGHTLY
Status: DISCONTINUED | OUTPATIENT
Start: 2022-11-12 | End: 2022-11-15 | Stop reason: HOSPADM

## 2022-11-12 RX ORDER — FLUOXETINE 10 MG/1
10 CAPSULE ORAL EVERY MORNING
Status: DISCONTINUED | OUTPATIENT
Start: 2022-11-12 | End: 2022-11-15 | Stop reason: HOSPADM

## 2022-11-12 RX ORDER — M-VIT,TX,IRON,MINS/CALC/FOLIC 27MG-0.4MG
1 TABLET ORAL DAILY
Status: DISCONTINUED | OUTPATIENT
Start: 2022-11-12 | End: 2022-11-15 | Stop reason: HOSPADM

## 2022-11-12 RX ORDER — ROPINIROLE 0.25 MG/1
0.5 TABLET, FILM COATED ORAL NIGHTLY
Status: DISCONTINUED | OUTPATIENT
Start: 2022-11-12 | End: 2022-11-15 | Stop reason: HOSPADM

## 2022-11-12 RX ORDER — DEXTROSE MONOHYDRATE 100 MG/ML
INJECTION, SOLUTION INTRAVENOUS CONTINUOUS PRN
Status: DISCONTINUED | OUTPATIENT
Start: 2022-11-12 | End: 2022-11-15 | Stop reason: HOSPADM

## 2022-11-12 RX ORDER — AMLODIPINE BESYLATE 10 MG/1
10 TABLET ORAL DAILY
Status: DISCONTINUED | OUTPATIENT
Start: 2022-11-12 | End: 2022-11-12

## 2022-11-12 RX ORDER — ONDANSETRON 2 MG/ML
4 INJECTION INTRAMUSCULAR; INTRAVENOUS EVERY 6 HOURS PRN
Status: DISCONTINUED | OUTPATIENT
Start: 2022-11-12 | End: 2022-11-15 | Stop reason: HOSPADM

## 2022-11-12 RX ORDER — ASPIRIN 81 MG/1
81 TABLET ORAL DAILY
Status: DISCONTINUED | OUTPATIENT
Start: 2022-11-12 | End: 2022-11-15 | Stop reason: HOSPADM

## 2022-11-12 RX ORDER — POLYETHYLENE GLYCOL 3350 17 G/17G
17 POWDER, FOR SOLUTION ORAL DAILY PRN
Status: DISCONTINUED | OUTPATIENT
Start: 2022-11-12 | End: 2022-11-15 | Stop reason: HOSPADM

## 2022-11-12 RX ORDER — CLOPIDOGREL BISULFATE 75 MG/1
75 TABLET ORAL DAILY
Status: DISCONTINUED | OUTPATIENT
Start: 2022-11-12 | End: 2022-11-15 | Stop reason: HOSPADM

## 2022-11-12 RX ORDER — ATORVASTATIN CALCIUM 40 MG/1
40 TABLET, FILM COATED ORAL NIGHTLY
Status: DISCONTINUED | OUTPATIENT
Start: 2022-11-12 | End: 2022-11-15 | Stop reason: HOSPADM

## 2022-11-12 RX ORDER — INSULIN LISPRO 100 [IU]/ML
0-4 INJECTION, SOLUTION INTRAVENOUS; SUBCUTANEOUS
Status: DISCONTINUED | OUTPATIENT
Start: 2022-11-12 | End: 2022-11-15 | Stop reason: HOSPADM

## 2022-11-12 RX ORDER — LABETALOL HYDROCHLORIDE 5 MG/ML
10 INJECTION, SOLUTION INTRAVENOUS ONCE
Status: COMPLETED | OUTPATIENT
Start: 2022-11-12 | End: 2022-11-12

## 2022-11-12 RX ORDER — AMLODIPINE BESYLATE 5 MG/1
5 TABLET ORAL DAILY
Status: DISCONTINUED | OUTPATIENT
Start: 2022-11-13 | End: 2022-11-15 | Stop reason: HOSPADM

## 2022-11-12 RX ADMIN — ENOXAPARIN SODIUM 40 MG: 100 INJECTION SUBCUTANEOUS at 17:02

## 2022-11-12 RX ADMIN — IOPAMIDOL 75 ML: 755 INJECTION, SOLUTION INTRAVENOUS at 11:17

## 2022-11-12 RX ADMIN — ASPIRIN 81 MG: 81 TABLET, COATED ORAL at 19:09

## 2022-11-12 RX ADMIN — LOSARTAN POTASSIUM 25 MG: 25 TABLET, FILM COATED ORAL at 19:10

## 2022-11-12 RX ADMIN — CLOPIDOGREL BISULFATE 75 MG: 75 TABLET ORAL at 19:09

## 2022-11-12 RX ADMIN — ATORVASTATIN CALCIUM 40 MG: 40 TABLET, FILM COATED ORAL at 21:37

## 2022-11-12 RX ADMIN — LABETALOL HYDROCHLORIDE 10 MG: 5 INJECTION, SOLUTION INTRAVENOUS at 13:04

## 2022-11-12 ASSESSMENT — PAIN - FUNCTIONAL ASSESSMENT: PAIN_FUNCTIONAL_ASSESSMENT: 0-10

## 2022-11-12 ASSESSMENT — PAIN SCALES - GENERAL
PAINLEVEL_OUTOF10: 0
PAINLEVEL_OUTOF10: 0

## 2022-11-12 NOTE — CODE DOCUMENTATION
Patient says that on Wednesday she started noticing a change in her facial feeling. Patient says that she felt like she was going to pass out, felt weak. On arrival to ED, patient says she feels like she can hardly talk right because the left side of her face and mouth feels numb. Patient says nothing has gotten worse since Wednesday, slight left mouth droop noted, some aphasia noted, endorses weakness in her left arm and leg and says she sometimes has trouble trying to say what she wants to say.

## 2022-11-12 NOTE — ED NOTES
ED TO INPATIENT SBAR HANDOFF    Patient Name: Vern Hodgkins   :  1945  65 y.o. MRN:  [id number]  Preferred Name  [facility] Blood  ED Room #:  TR01/01TR-01  Family/Caregiver Present yes   Restraints no   Sitter no   Sepsis Risk Score Sepsis Risk Score: 1.36    Situation  Code Status: No Order No additional code details. Allergies: Statins  Weight: Patient Vitals for the past 96 hrs (Last 3 readings):   Weight   22 1028 145 lb (65.8 kg)     Arrived from: home  Chief Complaint:   Chief Complaint   Patient presents with    Extremity Weakness     Left sided, started [facility] Problem/Diagnosis:  Principal Problem:    Stroke-like symptoms  Resolved Problems:    * No resolved hospital problems. *    Imaging:   XR CHEST PORTABLE   Final Result   No acute process. CTA HEAD NECK W CONTRAST   Final Result   1. Decreased attenuation involving the right basal ganglia which may   represent an age indeterminate infarct. 2. Otherwise, no acute intracranial abnormality. 3. Mild global parenchymal volume loss with chronic microvascular ischemic   changes. 4. No flow limiting stenosis seen of the cervical carotid/vertebral arteries. 5. Atherosclerosis contributes to stenosis involving the supraclinoid ICAs   bilaterally, mild on the right and moderate on the left. 6. Mild-to-moderate stenosis involving the A2/A3 segments of the anterior   cerebral arteries bilaterally. 7. Moderate stenosis involving the P1 segment of the right posterior cerebral   artery with moderate focal stenosis involving the right P2/P3 segment. 8. Mild stenosis involving the P1 segment of the left posterior cerebral   artery. CT Head W/O Contrast   Final Result   1. Decreased attenuation involving the right basal ganglia which may   represent an age indeterminate infarct. 2. Otherwise, no acute intracranial abnormality. 3. Mild global parenchymal volume loss with chronic microvascular ischemic   changes. 4. No flow limiting stenosis seen of the cervical carotid/vertebral arteries. 5. Atherosclerosis contributes to stenosis involving the supraclinoid ICAs   bilaterally, mild on the right and moderate on the left. 6. Mild-to-moderate stenosis involving the A2/A3 segments of the anterior   cerebral arteries bilaterally. 7. Moderate stenosis involving the P1 segment of the right posterior cerebral   artery with moderate focal stenosis involving the right P2/P3 segment. 8. Mild stenosis involving the P1 segment of the left posterior cerebral   artery. Abnormal labs:   Abnormal Labs Reviewed   CBC WITH AUTO DIFFERENTIAL - Abnormal; Notable for the following components:       Result Value    MCH 31.3 (*)     Segs Relative 73.5 (*)     Lymphocytes % 17.3 (*)     Monocytes % 7.4 (*)     All other components within normal limits   COMPREHENSIVE METABOLIC PANEL - Abnormal; Notable for the following components:    Chloride 98 (*)     Glucose 127 (*)     All other components within normal limits   PROTIME-INR - Abnormal; Notable for the following components:    Protime 11.3 (*)     All other components within normal limits   APTT - Abnormal; Notable for the following components:    aPTT 24.0 (*)     All other components within normal limits   POCT GLUCOSE - Abnormal; Notable for the following components:    POC Glucose 120 (*)     All other components within normal limits     Critical values:      Abnormal Assessment Findings: Left sided weakness, slight left facial droop    Background  History:   Past Medical History:   Diagnosis Date    Anxiety      has OCD. He had a stroke.       Diabetes mellitus ([facility] [address]     Hypertension        Assessment    Vitals/MEWS: MEWS Score: 1  Level of Consciousness: Alert (0)   Vitals:    11/12/22 1133 11/12/22 1203 11/12/22 1231 11/12/22 1252   BP: (!) 188/76 (!) 168/86 (!) 169/63 (!) 185/71   Pulse: 60 59 60 59   Resp: 18 13 20 19   Temp:  97.9 °F (36.6 °C)  97.7 °F (36.5 °C)   TempSrc:  Oral  Oral   SpO2: 98% 97% 96% 97%   Weight:       Height:         FiO2 (%):   O2 Flow Rate: O2 Device: None (Room air)    Cardiac Rhythm: Cardiac Rhythm: Sinus rhythm  Pain Assessment:  [x] Verbal [] Shawna Median Scale  Pain Scale: Pain Assessment  Pain Assessment: 0-10  Pain Level: 0  Last documented pain score (0-10 scale) Pain Level: 0  Last documented pain medication administered: NA  Mental Status: oriented, alert, coherent, logical, thought processes intact, and able to concentrate and follow conversation  NIH Score:    C-SSRS:    Bedside swallow:    M[place]uel[place] Coma Scale (GCS): [place] Coma Scale  Eye Opening: Spontaneous  Best Verbal Response: Oriented  Best Motor Response: Obeys commands  [place] Coma Scale Score: 15  Active LDA's:   Peripheral IV 11/12/22 Right Antecubital (Active)     PO Status:   Pertinent or High Risk Medications/Drips: Labetalol   If Yes, please provide details:   Pending Blood Product Administration: no     You may also review the ED PT Care Timeline found under the Summary Nursing Index tab. Recommendation    Pending orders NA  Plan for Discharge (if known):    Additional Comments: NA   If any further questions, please call Sending RN at 1320    Electronically signed by: Electronically signed by Alisha Daly RN on 11/12/2022 at 12:53 PM      Alisha Daly RN  11/12/22 4652

## 2022-11-12 NOTE — ED NOTES
[facility] [address] [facility]     Sam Hubbard.  Alfredo  11/12/22 4320

## 2022-11-12 NOTE — ED PROVIDER NOTES
[address] ENCOUNTER      Pt Name: Amanda Martínez  MRN: [id number]  [place] 1945  Date of evaluation: 11/12/2022  Provider: Saumya Rider MD    CHIEF COMPLAINT       Chief Complaint   Patient presents with    Extremity Weakness     Left sided, started wednesday         HISTORY OF PRESENT ILLNESS    HPI    Nursing Notes were reviewed. Amanda Martínez is a 65 y.o. female who presents to the emergency department with left-sided weakness starting 4 days ago. The patient was sitting in a car 4 days ago when she suddenly felt a sensation of pins-and-needles and stinging in her face and generalized weakness. Subsequently, she felt generally unwell with malaise and nausea. She did notice that she was having some weakness and change in her speech over the next 24 hours. The weakness was primarily on the left side of her face. Over the next 2 to 3 days, the patient continued to have left-sided weakness and stumbled over her speech. This morning, she felt that the left side of her face was numb, as well and she came to the emergency department for evaluation. She denies headaches. She denies chest pain or difficulty breathing. She denies nausea or vomiting. She denies recent fevers. She denies cough. REVIEW OF SYSTEMS       Review of Systems    10 Systems were reviewed with this patient and all were negative with exception of those noted in the history of present illness above. PAST MEDICAL HISTORY     Past Medical History:   Diagnosis Date    Anxiety      has OCD. He had a stroke.       Diabetes mellitus ([facility] [address]     Hypertension          SURGICAL HISTORY       Past Surgical History:   Procedure Laterality Date    CHOLECYSTECTOMY      WISDOM TOOTH EXTRACTION      all over the years         CURRENT MEDICATIONS       Previous Medications    AMLODIPINE (NORVASC) 10 MG TABLET    Take 1 tablet by mouth daily ASPIRIN 81 MG CHEWABLE TABLET    Take 81 mg by mouth as needed     CHLORTHALIDONE (HYGROTON) 25 MG TABLET    Take 1 tablet by mouth daily    CHOLESTYRAMINE (QUESTRAN) 4 G PACKET    Take 1 packet by mouth in the morning and 1 packet before bedtime. FLUOXETINE (PROZAC) 10 MG CAPSULE    Take 1 capsule by mouth every morning    GLIMEPIRIDE (AMARYL) 1 MG TABLET    Take 1 mg by mouth every morning (before breakfast)    IRBESARTAN (AVAPRO) 300 MG TABLET    Take 1 tablet by mouth nightly    METFORMIN (GLUCOPHAGE-XR) 500 MG EXTENDED RELEASE TABLET    Take 2 tablets by mouth daily (with breakfast) And one in the evening. MULTIPLE VITAMINS-MINERALS (CENTRUM SILVER ADULT 50+) TABS    Take 1 tablet by mouth daily    ZINC 30 MG CAPS    Take 1 tablet by mouth daily       ALLERGIES     Statins    FAMILY HISTORY       Family History   Problem Relation Age of Onset    Cancer Mother     Cancer Father     Cancer Sister           SOCIAL HISTORY       Social History     Socioeconomic History    Marital status:      Spouse name: None    Number of children: None    Years of education: None    Highest education level: None   Tobacco Use    Smoking status: Never    Smokeless tobacco: Never   Vaping Use    Vaping Use: Never used   Substance and Sexual Activity    Alcohol use: No    Drug use: No       SCREENINGS   NIH Stroke Scale  Interval: Baseline  Level of Consciousness (1a): Alert  LOC Questions (1b): Answers both correctly  LOC Commands (1c): Performs both tasks correctly  Best Gaze (2): Normal  Visual (3): No visual loss  Facial Palsy (4): Normal symmetrical movement  Motor Arm, Left (5a): No drift  Motor Arm, Right (5b): No drift  Motor Leg, Left (6a): Drift, but does not hit bed  Motor Leg, Right (6b):  No drift  Limb Ataxia (7): Absent  Sensory (8): (!) Mild to Moderate  Best Language (9): No aphasia  Dysarthria (10): Mild to moderate, slurs some words  Extinction and Inattention (11): No abnormality  Total: 3 Miguelina Coma Scale  Eye Opening: Spontaneous  Best Verbal Response: Oriented  Best Motor Response: Obeys commands  Miguelina Coma Scale Score: 15                     CIWA Assessment  BP: (!) 169/63  Heart Rate: 60                 PHYSICAL EXAM       ED Triage Vitals [11/12/22 1028]   BP Temp Temp Source Heart Rate Resp SpO2 Height Weight   (!) 208/90 -- Oral 69 18 98 % 5' 2\" (1.575 m) 145 lb (65.8 kg)       Physical Exam  Vitals and nursing note reviewed. Constitutional:       General: She is not in acute distress. Appearance: Normal appearance. She is not ill-appearing, toxic-appearing or diaphoretic. HENT:      Head: Normocephalic and atraumatic. Nose: Nose normal.      Mouth/Throat:      Mouth: Mucous membranes are moist.   Eyes:      Extraocular Movements: Extraocular movements intact. Pupils: Pupils are equal, round, and reactive to light. Cardiovascular:      Rate and Rhythm: Normal rate and regular rhythm. Pulses: Normal pulses. Heart sounds: Normal heart sounds. Pulmonary:      Effort: Pulmonary effort is normal.      Breath sounds: Normal breath sounds. Abdominal:      Palpations: Abdomen is soft. Tenderness: There is no abdominal tenderness. Musculoskeletal:      Cervical back: Normal range of motion. Right lower leg: No edema. Left lower leg: No edema. Skin:     General: Skin is warm and dry. Capillary Refill: Capillary refill takes less than 2 seconds. Neurological:      Mental Status: She is alert and oriented to person, place, and time. GCS: GCS eye subscore is 4. GCS verbal subscore is 5. GCS motor subscore is 6. Cranial Nerves: Dysarthria present. No facial asymmetry. Sensory: Sensory deficit present. Motor: Weakness present. Comments: Left leg weakness. Decreased sensation on left side of the face. Mild dysarthria.    Psychiatric:         Mood and Affect: Mood normal.         Behavior: Behavior normal. DIAGNOSTIC RESULTS     EKG: All EKG's are interpreted by the Emergency Department Physician who either signs or Co-signs this chart in the absence of a cardiologist.    Normal sinus rhythm. Ventricular rate of 67. OR is 126. QRS is 82. QTc is 426. There are no abnormal ST elevations or depressions. There is no ectopy. There is no significant change from prior EKG from July 2022. RADIOLOGY:   Non-plain film images such as CT, Ultrasound and MRI are read by the radiologist. Plain radiographic images are visualized and preliminarily interpreted by the emergency physician with the below findings:    Interpretation per the Radiologist below, if available at the time of this note:    XR CHEST PORTABLE   Final Result   No acute process. CTA HEAD NECK W CONTRAST   Final Result   1. Decreased attenuation involving the right basal ganglia which may   represent an age indeterminate infarct. 2. Otherwise, no acute intracranial abnormality. 3. Mild global parenchymal volume loss with chronic microvascular ischemic   changes. 4. No flow limiting stenosis seen of the cervical carotid/vertebral arteries. 5. Atherosclerosis contributes to stenosis involving the supraclinoid ICAs   bilaterally, mild on the right and moderate on the left. 6. Mild-to-moderate stenosis involving the A2/A3 segments of the anterior   cerebral arteries bilaterally. 7. Moderate stenosis involving the P1 segment of the right posterior cerebral   artery with moderate focal stenosis involving the right P2/P3 segment. 8. Mild stenosis involving the P1 segment of the left posterior cerebral   artery. CT Head W/O Contrast   Final Result   1. Decreased attenuation involving the right basal ganglia which may   represent an age indeterminate infarct. 2. Otherwise, no acute intracranial abnormality. 3. Mild global parenchymal volume loss with chronic microvascular ischemic   changes.    4. No flow limiting stenosis seen of the cervical carotid/vertebral arteries. 5. Atherosclerosis contributes to stenosis involving the supraclinoid ICAs   bilaterally, mild on the right and moderate on the left. 6. Mild-to-moderate stenosis involving the A2/A3 segments of the anterior   cerebral arteries bilaterally. 7. Moderate stenosis involving the P1 segment of the right posterior cerebral   artery with moderate focal stenosis involving the right P2/P3 segment. 8. Mild stenosis involving the P1 segment of the left posterior cerebral   artery. LABS:  Labs Reviewed   CBC WITH AUTO DIFFERENTIAL - Abnormal; Notable for the following components:       Result Value    MCH 31.3 (*)     Segs Relative 73.5 (*)     Lymphocytes % 17.3 (*)     Monocytes % 7.4 (*)     All other components within normal limits   COMPREHENSIVE METABOLIC PANEL - Abnormal; Notable for the following components:    Chloride 98 (*)     Glucose 127 (*)     All other components within normal limits   PROTIME-INR - Abnormal; Notable for the following components:    Protime 11.3 (*)     All other components within normal limits   APTT - Abnormal; Notable for the following components:    aPTT 24.0 (*)     All other components within normal limits   POCT GLUCOSE - Abnormal; Notable for the following components:    POC Glucose 120 (*)     All other components within normal limits   TROPONIN   URINALYSIS       All other labs were within normal range or not returned as of this dictation. EMERGENCY DEPARTMENT COURSE and DIFFERENTIAL DIAGNOSIS/MDM:   Vitals:    Vitals:    11/12/22 1054 11/12/22 1133 11/12/22 1203 11/12/22 1231   BP: (!) 208/90 (!) 188/76 (!) 168/86 (!) 169/63   Pulse: 70 60 59 60   Resp:  18 13 20   Temp:   97.9 °F (36.6 °C)    TempSrc:   Oral    SpO2: 96% 98% 97% 96%   Weight:       Height:             MDM  The patient presents to the emergency department with strokelike symptoms, however she has had the symptoms for over 4 days.   As a result, the patient was not considered a potential candidate for thrombolytic or aggressive interventional therapy and a stroke alert was not called. The patient was still evaluated with CT imaging to evaluate for stroke. This demonstrates a number of significant findings concerning for subacute stroke that may have been initiated 4 days ago. REASSESSMENT      The imaging indicates likelihood of a subacute stroke. The patient was discussed with the [facility] stroke team and they do not feel that there is any indication for acute intervention at this time. Do not feel that there is need for immediate transfer to a comprehensive stroke center. The patient will be admitted to the hospital for further evaluation by medicine and neurology. CONSULTS:  IP CONSULT TO STROKE TEAM  IP CONSULT TO NEUROLOGY    PROCEDURES:  Unless otherwise noted below, none     Procedures      FINAL IMPRESSION      1. Cerebrovascular accident (CVA), unspecified mechanism ([facility] [address]          [address] Admitted 11/12/2022 12:47:39 PM      PATIENT REFERRED TO:  No follow-up provider specified. DISCHARGE MEDICATIONS:  New Prescriptions    No medications on file     Controlled Substances Monitoring:     RX Monitoring 4/2/2018   Attestation The Prescription Monitoring Report for this patient was reviewed today. Periodic Controlled Substance Monitoring No signs of potential drug abuse or diversion identified.        Sara Drummond MD (electronically signed)  Attending Emergency Physician            Sara Drummond MD  11/12/22 [id number]

## 2022-11-12 NOTE — H&P
V2.0  History and Physical      Name:  Jacque Sepulveda /Age/Sex: 1945  (65 y.o. female)   MRN & CSN:  [id number] & [id number] Encounter Date/Time: 2022 12:32 PM EST   Location:   PCP: Quinn Roche [address] Day: 1    Assessment and Plan:   Jacque Sepulveda is a 65 y.o. female with a pmh of T2DM, HTN who presents with Stroke-like symptoms    Hospital Problems             Last Modified POA    * (Principal) Stroke-like symptoms 2022 Yes       Left facial numbness and left-sided extremity weakness weakness  Acute/subacute stroke  Abnormal CTH  --LKN 2022  --NIHSS 2  --Stroke alert not called, however ED did discuss with [facility], and patient was deemed not a candidate for acute intervention, secondary to symptom onset 4 days ago. Did recommend patient continue to have further neurology work-up  -- CT head shows decrease attenuation involving the right basal ganglia which may represent an age-indeterminate infarct  -- CTA of head and neck shows multiple areas of mild to moderate stenosis to both anterior and posterior circulation  --Consult Neurology   --DAPT  --Patient has sensitivity to statin  --Hemoglobin A1c ordered  - Lipid panel ordered  - 2D echo ordered  - MRI brain ordered  - PT/OT/ST  - Neurochecks every 4 hours  --Case management consult for D/C planning    Non-insulin-dependent T2DM  --Hold oral hypoglycemics  --Hemoglobin A1c ordered  - POCT before every meal and at bedtime  --low dose SSI  --Hypoglycemia protocol    Hypertension  --Generally we will hold for permissive hypertension, however symptom onset 4 days ago.   --OK to resume antihypertensives      Disposition:   Current Living situation: Home  Expected Disposition: Home  Estimated D/C: 1-2     Diet No diet orders on file   DVT Prophylaxis [] Lovenox, []  Heparin, [] SCDs, [] Ambulation,  [] Eliquis, [] Xarelto   Code Status No Order   Surrogate Decision Maker/ POA Self     History from:     patient, electronic medical record, Quality of history:  good historian    History of Present Illness:     Chief Complaint: Strokelike symptoms  Florina Moritz is a 65 y.o. female with pmh of HTN, noninsulin-dependent T2DM who presents with strokelike symptoms. Patient presented to the ED for left-sided extremity weakness, dysarthria, left facial numbness, with an onset of 4 days ago. Approximately 3 days ago, patient noticed his speech changes, and general malaise. [facility] consulted, however patient not a candidate for acute intervention, as symptoms started 4 days ago. CTA head and neck shows multiple areas of mild to moderate stenosis. Stroke risk factors include hypertension, and T2DM. Patent son and daughter are concerned for patient's well being at home, in which daughter reports that patient lives at home with spouse (their father) and he has dementia and aggressive behavior. Case management consulted to assist with family dynamics and d/c planning. On admission patient seen in ED, tearful at times. She is moving all extremities, with notable weakness to left upper and lower extremities. She denies vision or speech changes at this time. She endorses mild numbness to left side of face. Review of Systems: Need 10 Elements   Review of Systems   Neurological:  Positive for speech difficulty and numbness. Psychiatric/Behavioral:  The patient is nervous/anxious. All other systems reviewed and are negative. Objective:   No intake or output data in the 24 hours ending 11/12/22 1247   Vitals:   Vitals:    11/12/22 1054 11/12/22 1133 11/12/22 1203 11/12/22 1231   BP: (!) 208/90 (!) 188/76 (!) 168/86 (!) 169/63   Pulse: 70 60 59 60   Resp:  18 13 20   Temp:   97.9 °F (36.6 °C)    TempSrc:   Oral    SpO2: 96% 98% 97% 96%   Weight:       Height:           Medications Prior to Admission     Prior to Admission medications    Medication Sig Start Date End Date Taking?  Authorizing Provider   Zinc 30 MG CAPS Take 1 tablet by mouth daily    Historical Provider, MD   cholestyramine (QUESTRAN) 4 g packet Take 1 packet by mouth in the morning and 1 packet before bedtime. 7/28/22   Pranav Parra MD   chlorthalidone (HYGROTON) 25 MG tablet Take 1 tablet by mouth daily 5/19/22   Pranav Parra MD   amLODIPine (NORVASC) 10 MG tablet Take 1 tablet by mouth daily 5/19/22   Pranav Parra MD   glimepiride (AMARYL) 1 MG tablet Take 1 mg by mouth every morning (before breakfast)    Historical Provider, MD   metFORMIN (GLUCOPHAGE-XR) 500 MG extended release tablet Take 2 tablets by mouth daily (with breakfast) And one in the evening. Patient taking differently: Take 500 mg by mouth in the morning. 2/20/19   Marquita Mcnally MD   irbesartan (AVAPRO) 300 MG tablet Take 1 tablet by mouth nightly 2/20/19   Marquita Mcnally MD   FLUoxetine (PROZAC) 10 MG capsule Take 1 capsule by mouth every morning 2/20/19   Marquita Mcnally MD   aspirin 81 MG chewable tablet Take 81 mg by mouth as needed     Historical Provider, MD   Multiple Vitamins-Minerals (CENTRUM SILVER ADULT 50+) TABS Take 1 tablet by mouth daily    Historical Provider, MD       Physical Exam: Need 8 Elements   Physical Exam  Constitutional:       Appearance: Normal appearance. HENT:      Head: Normocephalic. Eyes:      Extraocular Movements: Extraocular movements intact. Pupils: Pupils are equal, round, and reactive to light. Cardiovascular:      Rate and Rhythm: Normal rate. Pulses: Normal pulses. Pulmonary:      Breath sounds: Normal breath sounds. Abdominal:      General: Bowel sounds are normal.      Palpations: Abdomen is soft. Musculoskeletal:         General: Normal range of motion. Cervical back: Normal range of motion. Skin:     General: Skin is warm and dry. Neurological:      Mental Status: She is alert and oriented to person, place, and time. Sensory: Sensory deficit present. Motor: Weakness present. weakness x 4 days, disarthria Has a \"code stroke\" or \"stroke alert\" been called? ->No Decision Support Exception - unselect if not a suspected or confirmed emergency medical condition->Emergency Medical Condition (MA) Reason for Exam: Left Side weakness x 4 days, disarthria Additional signs and symptoms: 75ml Isovue 370; ORDERING SYSTEM PROVIDED HISTORY: Stroke sx x 4 days TECHNOLOGIST PROVIDED HISTORY: Reason for exam:->Stroke sx x 4 days Has a \"code stroke\" or \"stroke alert\" been called? ->No Decision Support Exception - unselect if not a suspected or confirmed emergency medical condition->Emergency Medical Condition (MA) Reason for Exam: Stroke sx x 4 days Initial evaluation. FINDINGS: CT HEAD: BRAIN/VENTRICLES: There is decreased attenuation involving the right basal ganglia. No acute intracranial hemorrhage or extraaxial fluid collection. Grey-white differentiation is maintained. No evidence of mass, mass effect or midline shift. No evidence of hydrocephalus. There are areas of hypoattenuation in the periventricular and subcortical white matter, which is nonspecific, but may represent chronic microvasvular ischemic change. There appears to be a small chronic infarct in the right cerebellum. Minimal global parenchymal volume loss. Atherosclerosis of the intracranial vasculature is noted. ORBITS: The visualized portion of the orbits demonstrate no acute abnormality. SINUSES:  The visualized paranasal sinuses and mastoid air cells demonstrate no acute abnormality. SOFT TISSUES/SKULL: No acute abnormality of the visualized skull or soft tissues. CTA NECK: AORTIC ARCH/ARCH VESSELS: Scattered atherosclerosis seen of the aortic arch and arch vessels. Incidentally noted is a common origin of the innominate and right common carotid arteries, which is a normal variant. No significant stenosis seen of the innominate or subclavian arteries. CAROTID ARTERIES: No focal stenosis seen of the common carotid arteries. Minimal atherosclerosis involving the proximal cervical ICAs bilaterally. No flow limiting stenosis of the internal carotid arteries by NASCET criteria. No evidence of a dissection. VERTEBRAL ARTERIES: No dissection or flow limiting stenosis seen of the vertebral arteries. SOFT TISSUES: No focal consolidation within the visualized lung apices. No acute abnormality within the visualized superior mediastinum. BONES: No acute osseous abnormality. CTA HEAD: ANTERIOR CIRCULATION: Atherosclerosis seen of the internal carotid arteries bilaterally. This contributes to mild stenosis of the right supraclinoid ICA with moderate stenosis of the left supraclinoid ICA. There is a 1-2 mm outpouching arising from the left cervical ICA (sagittal series 605, image 114). There appears to be mild-to-moderate stenosis involving the A2/A3 segments bilaterally (sagittal MIP series 608, image 51). No significant stenosis seen of the middle cerebral arteries. POSTERIOR CIRCULATION: Moderate stenosis seen of the right P1 segment (coronal MIP series 607, image 49). There is mild stenosis of the left P1 segment (axial MIP series 606, image 41). There is moderate focal stenosis involving the right P2/P3 segment (axial MIP series 606, image 44). No significant stenosis seen of the basilar or vertebral arteries. No aneurysm identified. 1. Decreased attenuation involving the right basal ganglia which may represent an age indeterminate infarct. 2. Otherwise, no acute intracranial abnormality. 3. Mild global parenchymal volume loss with chronic microvascular ischemic changes. 4. No flow limiting stenosis seen of the cervical carotid/vertebral arteries. 5. Atherosclerosis contributes to stenosis involving the supraclinoid ICAs bilaterally, mild on the right and moderate on the left. 6. Mild-to-moderate stenosis involving the A2/A3 segments of the anterior cerebral arteries bilaterally.  7. Moderate stenosis involving the P1 segment of the right posterior cerebral artery with moderate focal stenosis involving the right P2/P3 segment. 8. Mild stenosis involving the P1 segment of the left posterior cerebral artery. XR CHEST PORTABLE    Result Date: 11/12/2022  EXAMINATION: ONE XRAY VIEW OF THE CHEST 11/12/2022 11:38 am COMPARISON: 07/24/2022 HISTORY: ORDERING SYSTEM PROVIDED HISTORY: Stroke TECHNOLOGIST PROVIDED HISTORY: Reason for exam:->Stroke Reason for Exam: stroke FINDINGS: The lungs are without acute focal process. There is no effusion or pneumothorax. The cardiomediastinal silhouette is stable. The osseous structures are stable. No acute process. CTA HEAD NECK W CONTRAST    Result Date: 11/12/2022  EXAMINATION: CTA OF THE HEAD AND NECK WITH CONTRAST; CT OF THE HEAD WITHOUT CONTRAST 11/12/2022 11:15 am; 11/12/2022 11:16 am: TECHNIQUE: CTA of the head and neck was performed with the administration of intravenous contrast. Multiplanar reformatted images are provided for review. MIP images are provided for review. Stenosis of the internal carotid arteries measured using NASCET criteria. Automated exposure control, iterative reconstruction, and/or weight based adjustment of the mA/kV was utilized to reduce the radiation dose to as low as reasonably achievable.; CT of the head was performed without the administration of intravenous contrast. Automated exposure control, iterative reconstruction, and/or weight based adjustment of the mA/kV was utilized to reduce the radiation dose to as low as reasonably achievable. Noncontrast CT of the head with reconstructed 2-D images are also provided for review. COMPARISON: None. HISTORY: ORDERING SYSTEM PROVIDED HISTORY: Left Side weakness x 4 days, disarthria TECHNOLOGIST PROVIDED HISTORY: Reason for exam:->Left Side weakness x 4 days, disarthria Has a \"code stroke\" or \"stroke alert\" been called? ->No Decision Support Exception - unselect if not a suspected or confirmed emergency medical condition->Emergency Medical Condition (MA) Reason for Exam: Left Side weakness x 4 days, disarthria Additional signs and symptoms: 75ml Isovue 370; ORDERING SYSTEM PROVIDED HISTORY: Stroke sx x 4 days TECHNOLOGIST PROVIDED HISTORY: Reason for exam:->Stroke sx x 4 days Has a \"code stroke\" or \"stroke alert\" been called? ->No Decision Support Exception - unselect if not a suspected or confirmed emergency medical condition->Emergency Medical Condition (MA) Reason for Exam: Stroke sx x 4 days Initial evaluation. FINDINGS: CT HEAD: BRAIN/VENTRICLES: There is decreased attenuation involving the right basal ganglia. No acute intracranial hemorrhage or extraaxial fluid collection. Grey-white differentiation is maintained. No evidence of mass, mass effect or midline shift. No evidence of hydrocephalus. There are areas of hypoattenuation in the periventricular and subcortical white matter, which is nonspecific, but may represent chronic microvasvular ischemic change. There appears to be a small chronic infarct in the right cerebellum. Minimal global parenchymal volume loss. Atherosclerosis of the intracranial vasculature is noted. ORBITS: The visualized portion of the orbits demonstrate no acute abnormality. SINUSES:  The visualized paranasal sinuses and mastoid air cells demonstrate no acute abnormality. SOFT TISSUES/SKULL: No acute abnormality of the visualized skull or soft tissues. CTA NECK: AORTIC ARCH/ARCH VESSELS: Scattered atherosclerosis seen of the aortic arch and arch vessels. Incidentally noted is a common origin of the innominate and right common carotid arteries, which is a normal variant. No significant stenosis seen of the innominate or subclavian arteries. CAROTID ARTERIES: No focal stenosis seen of the common carotid arteries. Minimal atherosclerosis involving the proximal cervical ICAs bilaterally. No flow limiting stenosis of the internal carotid arteries by NASCET criteria.  No evidence of a dissection. VERTEBRAL ARTERIES: No dissection or flow limiting stenosis seen of the vertebral arteries. SOFT TISSUES: No focal consolidation within the visualized lung apices. No acute abnormality within the visualized superior mediastinum. BONES: No acute osseous abnormality. CTA HEAD: ANTERIOR CIRCULATION: Atherosclerosis seen of the internal carotid arteries bilaterally. This contributes to mild stenosis of the right supraclinoid ICA with moderate stenosis of the left supraclinoid ICA. There is a 1-2 mm outpouching arising from the left cervical ICA (sagittal series 605, image 114). There appears to be mild-to-moderate stenosis involving the A2/A3 segments bilaterally (sagittal MIP series 608, image 51). No significant stenosis seen of the middle cerebral arteries. POSTERIOR CIRCULATION: Moderate stenosis seen of the right P1 segment (coronal MIP series 607, image 49). There is mild stenosis of the left P1 segment (axial MIP series 606, image 41). There is moderate focal stenosis involving the right P2/P3 segment (axial MIP series 606, image 44). No significant stenosis seen of the basilar or vertebral arteries. No aneurysm identified. 1. Decreased attenuation involving the right basal ganglia which may represent an age indeterminate infarct. 2. Otherwise, no acute intracranial abnormality. 3. Mild global parenchymal volume loss with chronic microvascular ischemic changes. 4. No flow limiting stenosis seen of the cervical carotid/vertebral arteries. 5. Atherosclerosis contributes to stenosis involving the supraclinoid ICAs bilaterally, mild on the right and moderate on the left. 6. Mild-to-moderate stenosis involving the A2/A3 segments of the anterior cerebral arteries bilaterally. 7. Moderate stenosis involving the P1 segment of the right posterior cerebral artery with moderate focal stenosis involving the right P2/P3 segment.  8. Mild stenosis involving the P1 segment of the left posterior cerebral artery.        Personally reviewed Lab Studies, Imaging, and discussed case with Dr. Fidel Moore    Electronically signed by Ane Boast, APRN - CNP on 11/12/2022 at 12:47 PM

## 2022-11-13 PROBLEM — G81.94 LEFT HEMIPARESIS (HCC): Status: ACTIVE | Noted: 2022-11-13

## 2022-11-13 PROBLEM — I63.50 RIGHT PONTINE STROKE (HCC): Status: ACTIVE | Noted: 2022-11-13

## 2022-11-13 PROBLEM — R00.1 BRADYCARDIA: Status: ACTIVE | Noted: 2022-11-13

## 2022-11-13 PROBLEM — E78.2 MIXED HYPERLIPIDEMIA: Status: ACTIVE | Noted: 2022-11-13

## 2022-11-13 LAB
ANION GAP SERPL CALCULATED.3IONS-SCNC: 12 MMOL/L (ref 4–16)
BASE EXCESS MIXED: 4.5 (ref 0–2.3)
BASOPHILS ABSOLUTE: 0 K/CU MM
BASOPHILS RELATIVE PERCENT: 0.5 % (ref 0–1)
BUN BLDV-MCNC: 14 MG/DL (ref 6–23)
CALCIUM SERPL-MCNC: 9.3 MG/DL (ref 8.3–10.6)
CHLORIDE BLD-SCNC: 101 MMOL/L (ref 99–110)
CHOLESTEROL: 250 MG/DL
CO2: 25 MMOL/L (ref 21–32)
COMMENT: ABNORMAL
CREAT SERPL-MCNC: 0.7 MG/DL (ref 0.6–1.1)
DIFFERENTIAL TYPE: ABNORMAL
EKG ATRIAL RATE: 57 BPM
EKG DIAGNOSIS: NORMAL
EKG P AXIS: 82 DEGREES
EKG P-R INTERVAL: 166 MS
EKG Q-T INTERVAL: 490 MS
EKG QRS DURATION: 76 MS
EKG QTC CALCULATION (BAZETT): 476 MS
EKG R AXIS: 6 DEGREES
EKG T AXIS: 26 DEGREES
EKG VENTRICULAR RATE: 57 BPM
EOSINOPHILS ABSOLUTE: 0.1 K/CU MM
EOSINOPHILS RELATIVE PERCENT: 1.1 % (ref 0–3)
ESTIMATED AVERAGE GLUCOSE: 146 MG/DL
FERRITIN: 95 NG/ML (ref 15–150)
GFR SERPL CREATININE-BSD FRML MDRD: >60 ML/MIN/1.73M2
GLUCOSE BLD-MCNC: 124 MG/DL (ref 70–99)
GLUCOSE BLD-MCNC: 154 MG/DL (ref 70–99)
GLUCOSE BLD-MCNC: 178 MG/DL (ref 70–99)
GLUCOSE BLD-MCNC: 184 MG/DL (ref 70–99)
GLUCOSE BLD-MCNC: 231 MG/DL (ref 70–99)
HBA1C MFR BLD: 6.7 % (ref 4.2–6.3)
HCO3 VENOUS: 28.4 MMOL/L (ref 19–25)
HCT VFR BLD CALC: 38.5 % (ref 37–47)
HDLC SERPL-MCNC: 56 MG/DL
HEMOGLOBIN: 13.4 GM/DL (ref 12.5–16)
IMMATURE NEUTROPHIL %: 0.3 % (ref 0–0.43)
LACTIC ACID, SEPSIS: 1.6 MMOL/L (ref 0.5–1.9)
LDL CHOLESTEROL CALCULATED: 169 MG/DL
LV EF: 58 %
LVEF MODALITY: NORMAL
LYMPHOCYTES ABSOLUTE: 1.6 K/CU MM
LYMPHOCYTES RELATIVE PERCENT: 21.9 % (ref 24–44)
MAGNESIUM: 1.8 MG/DL (ref 1.8–2.4)
MCH RBC QN AUTO: 31.6 PG (ref 27–31)
MCHC RBC AUTO-ENTMCNC: 34.8 % (ref 32–36)
MCV RBC AUTO: 90.8 FL (ref 78–100)
MONOCYTES ABSOLUTE: 0.7 K/CU MM
MONOCYTES RELATIVE PERCENT: 8.7 % (ref 0–4)
NUCLEATED RBC %: 0 %
O2 SAT, VEN: 95.3 % (ref 50–70)
PCO2, VEN: 39 MMHG (ref 38–52)
PDW BLD-RTO: 12.6 % (ref 11.7–14.9)
PH VENOUS: 7.47 (ref 7.32–7.42)
PLATELET # BLD: 320 K/CU MM (ref 140–440)
PMV BLD AUTO: 9.4 FL (ref 7.5–11.1)
PO2, VEN: 230 MMHG (ref 28–48)
POTASSIUM SERPL-SCNC: 3.9 MMOL/L (ref 3.5–5.1)
PROLACTIN: 46.4 NG/ML
RBC # BLD: 4.24 M/CU MM (ref 4.2–5.4)
SEGMENTED NEUTROPHILS ABSOLUTE COUNT: 5 K/CU MM
SEGMENTED NEUTROPHILS RELATIVE PERCENT: 67.5 % (ref 36–66)
SODIUM BLD-SCNC: 138 MMOL/L (ref 135–145)
TOTAL IMMATURE NEUTOROPHIL: 0.02 K/CU MM
TOTAL NUCLEATED RBC: 0 K/CU MM
TRIGL SERPL-MCNC: 126 MG/DL
TROPONIN T: <0.01 NG/ML
TROPONIN T: <0.01 NG/ML
TSH HIGH SENSITIVITY: 1.78 UIU/ML (ref 0.27–4.2)
WBC # BLD: 7.4 K/CU MM (ref 4–10.5)

## 2022-11-13 PROCEDURE — 93005 ELECTROCARDIOGRAM TRACING: CPT | Performed by: STUDENT IN AN ORGANIZED HEALTH CARE EDUCATION/TRAINING PROGRAM

## 2022-11-13 PROCEDURE — 93306 TTE W/DOPPLER COMPLETE: CPT

## 2022-11-13 PROCEDURE — 6370000000 HC RX 637 (ALT 250 FOR IP): Performed by: STUDENT IN AN ORGANIZED HEALTH CARE EDUCATION/TRAINING PROGRAM

## 2022-11-13 PROCEDURE — 82962 GLUCOSE BLOOD TEST: CPT

## 2022-11-13 PROCEDURE — 6370000000 HC RX 637 (ALT 250 FOR IP)

## 2022-11-13 PROCEDURE — 85025 COMPLETE CBC W/AUTO DIFF WBC: CPT

## 2022-11-13 PROCEDURE — 84443 ASSAY THYROID STIM HORMONE: CPT

## 2022-11-13 PROCEDURE — 96372 THER/PROPH/DIAG INJ SC/IM: CPT

## 2022-11-13 PROCEDURE — 1200000000 HC SEMI PRIVATE

## 2022-11-13 PROCEDURE — G0378 HOSPITAL OBSERVATION PER HR: HCPCS

## 2022-11-13 PROCEDURE — 96366 THER/PROPH/DIAG IV INF ADDON: CPT

## 2022-11-13 PROCEDURE — 84146 ASSAY OF PROLACTIN: CPT

## 2022-11-13 PROCEDURE — 96365 THER/PROPH/DIAG IV INF INIT: CPT

## 2022-11-13 PROCEDURE — 86618 LYME DISEASE ANTIBODY: CPT

## 2022-11-13 PROCEDURE — 94761 N-INVAS EAR/PLS OXIMETRY MLT: CPT

## 2022-11-13 PROCEDURE — 2700000000 HC OXYGEN THERAPY PER DAY

## 2022-11-13 PROCEDURE — 93010 ELECTROCARDIOGRAM REPORT: CPT | Performed by: INTERNAL MEDICINE

## 2022-11-13 PROCEDURE — 83605 ASSAY OF LACTIC ACID: CPT

## 2022-11-13 PROCEDURE — 84484 ASSAY OF TROPONIN QUANT: CPT

## 2022-11-13 PROCEDURE — 80061 LIPID PANEL: CPT

## 2022-11-13 PROCEDURE — 82728 ASSAY OF FERRITIN: CPT

## 2022-11-13 PROCEDURE — 99223 1ST HOSP IP/OBS HIGH 75: CPT | Performed by: INTERNAL MEDICINE

## 2022-11-13 PROCEDURE — 80048 BASIC METABOLIC PNL TOTAL CA: CPT

## 2022-11-13 PROCEDURE — 36415 COLL VENOUS BLD VENIPUNCTURE: CPT

## 2022-11-13 PROCEDURE — 6360000002 HC RX W HCPCS: Performed by: INTERNAL MEDICINE

## 2022-11-13 PROCEDURE — 83735 ASSAY OF MAGNESIUM: CPT

## 2022-11-13 PROCEDURE — 6360000002 HC RX W HCPCS: Performed by: STUDENT IN AN ORGANIZED HEALTH CARE EDUCATION/TRAINING PROGRAM

## 2022-11-13 PROCEDURE — 82805 BLOOD GASES W/O2 SATURATION: CPT

## 2022-11-13 PROCEDURE — 96375 TX/PRO/DX INJ NEW DRUG ADDON: CPT

## 2022-11-13 PROCEDURE — 99205 OFFICE O/P NEW HI 60 MIN: CPT | Performed by: STUDENT IN AN ORGANIZED HEALTH CARE EDUCATION/TRAINING PROGRAM

## 2022-11-13 PROCEDURE — 6360000002 HC RX W HCPCS

## 2022-11-13 PROCEDURE — 83036 HEMOGLOBIN GLYCOSYLATED A1C: CPT

## 2022-11-13 RX ORDER — HYDRALAZINE HYDROCHLORIDE 20 MG/ML
10 INJECTION INTRAMUSCULAR; INTRAVENOUS EVERY 6 HOURS PRN
Status: DISCONTINUED | OUTPATIENT
Start: 2022-11-13 | End: 2022-11-15 | Stop reason: HOSPADM

## 2022-11-13 RX ORDER — LABETALOL HYDROCHLORIDE 5 MG/ML
10 INJECTION, SOLUTION INTRAVENOUS EVERY 4 HOURS PRN
Status: DISCONTINUED | OUTPATIENT
Start: 2022-11-13 | End: 2022-11-15 | Stop reason: HOSPADM

## 2022-11-13 RX ORDER — MAGNESIUM SULFATE IN WATER 40 MG/ML
2000 INJECTION, SOLUTION INTRAVENOUS ONCE
Status: COMPLETED | OUTPATIENT
Start: 2022-11-13 | End: 2022-11-13

## 2022-11-13 RX ADMIN — HYDRALAZINE HYDROCHLORIDE 10 MG: 20 INJECTION INTRAMUSCULAR; INTRAVENOUS at 22:52

## 2022-11-13 RX ADMIN — FLUOXETINE 10 MG: 10 CAPSULE ORAL at 10:33

## 2022-11-13 RX ADMIN — INSULIN LISPRO 1 UNITS: 100 INJECTION, SOLUTION INTRAVENOUS; SUBCUTANEOUS at 12:59

## 2022-11-13 RX ADMIN — CLOPIDOGREL BISULFATE 75 MG: 75 TABLET ORAL at 10:33

## 2022-11-13 RX ADMIN — AMLODIPINE BESYLATE 5 MG: 5 TABLET ORAL at 10:33

## 2022-11-13 RX ADMIN — ASPIRIN 81 MG: 81 TABLET, COATED ORAL at 10:33

## 2022-11-13 RX ADMIN — ROPINIROLE HYDROCHLORIDE 0.5 MG: 0.25 TABLET, FILM COATED ORAL at 05:07

## 2022-11-13 RX ADMIN — Medication 1 TABLET: at 10:33

## 2022-11-13 RX ADMIN — MAGNESIUM SULFATE HEPTAHYDRATE 2000 MG: 2 INJECTION, SOLUTION INTRAVENOUS at 17:56

## 2022-11-13 RX ADMIN — ATORVASTATIN CALCIUM 40 MG: 40 TABLET, FILM COATED ORAL at 20:22

## 2022-11-13 RX ADMIN — ENOXAPARIN SODIUM 40 MG: 100 INJECTION SUBCUTANEOUS at 10:32

## 2022-11-13 RX ADMIN — LOSARTAN POTASSIUM 25 MG: 25 TABLET, FILM COATED ORAL at 10:33

## 2022-11-13 ASSESSMENT — PAIN SCALES - GENERAL
PAINLEVEL_OUTOF10: 0

## 2022-11-13 NOTE — PROGRESS NOTES
Entered the room patient stated she felt very dizzy and light headed, tele notified nurse Mariella of HR in the 40's. Patient became unresponsive, sternal rubbed with no arousal. Rapid response initiated at 0655. HR 40, SpO2 87%, BP 82/50. Oxygen applied via nasal cannula at 3L. Patient clammy/ diaphoretic. EKG obtained 0659 bp rechecked 107/52 patient sternal rubbed again and opened her eyes. She was able to state he name at this point. Dr. Aman Reddy and NP [address] [place] [address] arrived at 0700. Labs ordered, cardiac consult ordered. Stroke scale completed by Dr. Aman Reddy at bedside. No new deficit noted by him. Patient back to her original self without concern. She states \"I am fine I just passed out I know my body, I am fine. \" Continue to monitor.

## 2022-11-13 NOTE — PROGRESS NOTES
Occupational Therapy ATTEMPT Treatment Note  Name: Remi Mckenzie MRN: [id number] :   1945   Date:  2022   Admission Date: 2022 Room:  -A     Primary Problem:  The encounter diagnosis was Cerebrovascular accident (CVA), unspecified mechanism ([facility] [address] Past Medical History:   Diagnosis Date    Anxiety      has OCD. He had a stroke. [facility]     Hypertension              PT/OT attempted to see pt at this time. RN reported to hold for today d/t code blue this morning. Pt experiencing difficulties with heart rhythm and syncopal episodes. Will re-attempt as pt becomes appropriate for therapy. Thank you.            Electronically signed by:    CONCHITA Savage/L  License: [id number]  , 9:13 AM

## 2022-11-13 NOTE — PROGRESS NOTES
V2.0  [facility] Hospitalist Progress Note      Name:  Artis Mayer /Age/Sex: 1945  (65 y.o. female)   MRN & CSN:  [id number] & [id number] Encounter Date/Time: 2022 11:31 AM EST    Location:  -A PCP: Dwight Roblero, APRN - [id number] Day: 2    Assessment and Plan:   Artis Mayer is a 65 y.o. female with pmh of type 2 diabetes mellitus, hypertension who presents with Stroke-like symptoms    Interval events:  -Briefly, patient is 65years old female with a past medical history of hypertension, non-insulin-dependent diabetes mellitus type 2, who presented with strokelike symptoms to the hospital on . Patient presented to the ED for left-sided extremity weakness, dysarthria, left facial numbness with an onset 4 days back, possibly redness day around afternoon. Patient reports to be in usual state of health after taking has been to his doctor's appointment, patient started to experience had left face started to sting. Patient subsequently noticed dysarthria, difficulty with gait and left facial droop. Patient also noticed speech changes and generalized malaise on admission as per record. [facility] was consulted, however patient not deemed a candidate for acute intervention as symptoms started 4 days back. CTA head and neck shows multiple areas of mild to moderate stenosis. Stroke risk factors including HTN, type 2 diabetes mellitus. On admission, reportedly per patient's family, patient'  has history of dementia and aggressive behavior. Case management was consulted to assist with with family dynamics and discharge planning. On admission, patient reported to be tearful at times. Vital signs with BP 1 7 7/87 mmHg, rest vital signs stable off oxygen on admission. Labs including CBC/CMP/troponin grossly unremarkable. CT head/CTA head neck with\"1. Decreased attenuation involving the right basal ganglia which may represent an age indeterminate infarct.  2. Otherwise, no acute intracranial abnormality. 3. Mild global parenchymal volume loss with chronic microvascular ischemic changes. 4. No flow limiting stenosis seen of the cervical carotid/vertebral arteries. 5. Atherosclerosis contributes to stenosis involving the supraclinoid ICAs bilaterally, mild on the right and moderate on the left. 6. Mild-to-moderate stenosis involving the A2/A3 segments of the anterior  cerebral arteries bilaterally. 7. Moderate stenosis involving the P1 segment of the right posterior cerebral artery with moderate focal stenosis involving the right P2/P3 segment. 8. Mild stenosis involving the P1 segment of the left posterior cerebral  Artery\". MRI brain 11/12 with\"Acute lacune within the keith. \".  -11/13-Rapid response/Stroke code was called for the patient around 7:50 AM today. Reportedly telemetry was showing sinus bradycardia to 30s/minute, patient was found to be unresponsive and hypotensive. Fingerstick glucose was 170s per staff. Patient slowly came to it and reported noticing some prodromal symptoms in terms of nausea/warmth and then passing out. Patient reports previous similar episodes of unclear etiology. Patient was recently given ropinirole tablet as continuation of her home medication for restless leg syndrome. Citing possibility of relation with the medications adverse effects, ropinirole was held. Similar episode occurred 1 hour later when patient was found to be bradycardic by telemetry, again becoming unresponsive, unclear whether a pulse was present so initially chest compressions were started and patient started to become responsive. Patient's blood pressure during both these episodes was hypotensive. Patient reported similar prodromal symptoms of nausea/vomiting and reports previous similar episodes patient also reports she has a family history of syncope in the setting of mitral valve prolapse and when she gets \"sick\", she tends to pass out. Set of basic labs were drawn. Patient improved symptomatically and case was discussed with cardiologist who assessed patient at bedside. Critical care team was also present at bedside during the episode of decompensation. Patient transferred to the ICU for further monitoring, chest pads in place. Given that patient blood pressure improved without intervention cardiologist considering possibility of vasovagal episode. Currently we will hold off on any dopamine drip/atropine given heart rate improves/blood pressure improved without intervention. But if patient gets worse/having persistent or recurrent symptomatic bradycardia, may possibly need dopamine infusion/temporary pacemaker. VBG-7.47/39/28, lactate 1.6, prolactin 46, CBC/basic unremarkable, lipid panel from a.m. with cholesterol 250/, A1c 6.7%, troponin/TSH/Lyme disease antibodies pending. Echo is currently pending. Plan:    Episode of unresponsiveness/syncope:  -Possibilities, vasovagal syncope versus possible valvular heart disease/MVP given family history?  -Monitor in ICU   -Telemetry  -TTE pending  -Cardiology consulted, will appreciate recs  -TSH/Lyme disease antibodies  -No indication for temporary pacemaker per cardiology at this point  -Monitor patient  -Avoid beta-blockers  -Outpatient 30-day monitor per cardiology  -Patient recently started on amlodipine 5 mg/losartan 25 mg  Left-sided facial numbness and left sided extremity weakness:  Acute/subacute stroke:  Abnormal CT head  -Last well-known 11/8/2022, NIHSS on admission to floor-2  -NIHSS 2 as per bedside examination 11/13  -CT head/CTA as above  -MRI as above  -Neurology consult  -DAPT  -Patient has sensitivity to statin  -HbA1c as above  -Lipid panel as above  -Transthoracic echocardiogram  -PT/OT  -Neurochecks every 6 hours next 24 hours  -Case management consult for discharge planning    Diet ADULT DIET;  Regular; 4 carb choices (60 gm/meal)   DVT Prophylaxis [] Lovenox, []  Heparin, [] SCDs, [] Ambulation,  [] Eliquis, [] Xarelto  [] Coumadin   Code Status Full Code   Disposition From: Home  Expected Disposition: TBD/pending  follow-up  Estimated Date of Discharge: 1-2 days  Patient requires continued admission due to neurology/cardiology clearance. Currently being monitored in the ICU   Surrogate Decision Maker/ POA Self     Subjective:     Chief Complaint: Extremity Weakness (Left sided, started wednesday)       Rey Gutierrez is a 65 y.o. female who presents with possible subacute stroke. Patient denies any complaint on subsequent examination after the rapid response. Lying comfortably in bed on O2 via nasal cannula for comfort. Review of Systems:    Review of Systems    Review of Systems:   Constitutional: Negative. Negative for activity change, appetite change, chills, diaphoresis, fatigue, fever and unexpected weight change. HENT: Negative. Negative for congestion, dental problem, drooling, ear discharge, ear pain, facial swelling, hearing loss, mouth sores, nosebleeds, postnasal drip, rhinorrhea, sinus pressure, sinus pain, sneezing, sore throat, tinnitus, trouble swallowing and voice change. Eyes: Negative. Negative for photophobia, pain, discharge, redness, itching and visual disturbance. Respiratory: Negative. Negative for apnea, cough, choking, chest tightness, shortness of breath, wheezing and stridor. Cardiovascular: Negative. Negative for chest pain and palpitations. Gastrointestinal: Negative. Negative for abdominal distention, abdominal pain, anal bleeding, blood in stool, constipation, diarrhea, nausea, rectal pain and vomiting. Endocrine: Negative. Negative for cold intolerance, heat intolerance, polydipsia, polyphagia and polyuria. Genitourinary: Negative.   Negative for decreased urine volume, difficulty urinating, dysuria, enuresis, flank pain, frequency, genital sores, hematuria, penile discharge, penile pain, penile swelling, scrotal swelling, testicular pain and urgency. Musculoskeletal: Negative. Negative for arthralgias, back pain, gait problem, joint swelling, myalgias, neck pain and neck stiffness. Skin: Negative. Negative for color change, pallor, rash and wound. Allergic/Immunologic: Negative for environmental allergies, food allergies and immunocompromised state. Neurological: Negative. Negative for dizziness, tremors, seizures, syncope, facial asymmetry, speech difficulty, weakness, light-headedness, numbness and headaches. Hematological: Negative. Negative for adenopathy. Does not bruise/bleed easily. Psychiatric/Behavioral: Negative. Negative for agitation, behavioral problems, confusion, decreased concentration, dysphoric mood, hallucinations, self-injury, sleep disturbance and suicidal ideas. The patient is not nervous/anxious and is not hyperactive. Objective: Intake/Output Summary (Last 24 hours) at 11/13/2022 1131  Last data filed at 11/13/2022 0506  Gross per 24 hour   Intake 240 ml   Output --   Net 240 ml        Vitals:   Vitals:    11/13/22 1000   BP: (!) 152/73   Pulse: 68   Resp: 16   Temp: 97.8 °F (36.6 °C)   SpO2: 100%       Physical Exam:     General: NAD  Eyes: EOMI  ENT: neck supple  Cardiovascular: Regular rate. Respiratory: Clear to auscultation  Gastrointestinal: Soft, non tender  Genitourinary: no suprapubic tenderness  Musculoskeletal: No edema  Skin: warm, dry  Neuro: Alert. Psych: Mood appropriate.      Medications:   Medications:    enoxaparin  40 mg SubCUTAneous Daily    clopidogrel  75 mg Oral Daily    aspirin  81 mg Oral Daily    losartan  25 mg Oral Daily    therapeutic multivitamin-minerals  1 tablet Oral Daily    FLUoxetine  10 mg Oral QAM    insulin lispro  0-4 Units SubCUTAneous TID WC    insulin lispro  0-4 Units SubCUTAneous Nightly    atorvastatin  40 mg Oral Nightly    amLODIPine  5 mg Oral Daily    [Held by provider] rOPINIRole  0.5 mg Oral Nightly      Infusions: dextrose       PRN Meds: labetalol, 10 mg, Q4H PRN  hydrALAZINE, 10 mg, Q6H PRN  ondansetron, 4 mg, Q8H PRN   Or  ondansetron, 4 mg, Q6H PRN  polyethylene glycol, 17 g, Daily PRN  glucose, 4 tablet, PRN  dextrose bolus, 125 mL, PRN   Or  dextrose bolus, 250 mL, PRN  glucagon (rDNA), 1 mg, PRN  dextrose, , Continuous PRN        Labs      Recent Results (from the past 24 hour(s))   POCT Glucose    Collection Time: 11/12/22  7:14 PM   Result Value Ref Range    POC Glucose 166 (H) 70 - 99 MG/DL   POCT Glucose    Collection Time: 11/12/22  9:05 PM   Result Value Ref Range    POC Glucose 181 (H) 70 - 99 MG/DL   POCT Glucose    Collection Time: 11/13/22  6:55 AM   Result Value Ref Range    POC Glucose 184 (H) 70 - 99 MG/DL   Hemoglobin A1c    Collection Time: 11/13/22  7:00 AM   Result Value Ref Range    Hemoglobin A1C 6.7 (H) 4.2 - 6.3 %    eAG 146 mg/dL   Lipid Panel    Collection Time: 11/13/22  7:00 AM   Result Value Ref Range    Triglycerides 126 <150 MG/DL    Cholesterol 250 (H) <200 MG/DL    HDL 56 >40 MG/DL    LDL Calculated 169 (H) <100 MG/DL   Ferritin    Collection Time: 11/13/22  7:00 AM   Result Value Ref Range    Ferritin 95 15 - 150 NG/ML   Basic Metabolic Panel w/ Reflex to MG    Collection Time: 11/13/22  7:00 AM   Result Value Ref Range    Sodium 138 135 - 145 MMOL/L    Potassium 3.9 3.5 - 5.1 MMOL/L    Chloride 101 99 - 110 mMol/L    CO2 25 21 - 32 MMOL/L    Anion Gap 12 4 - 16    BUN 14 6 - 23 MG/DL    Creatinine 0.7 0.6 - 1.1 MG/DL    Est, Glom Filt Rate >60 >60 mL/min/1.73m2    Glucose 178 (H) 70 - 99 MG/DL    Calcium 9.3 8.3 - 10.6 MG/DL   CBC with Auto Differential    Collection Time: 11/13/22  7:00 AM   Result Value Ref Range    WBC 7.4 4.0 - 10.5 K/CU MM    RBC 4.24 4.2 - 5.4 M/CU MM    Hemoglobin 13.4 12.5 - 16.0 GM/DL    Hematocrit 38.5 37 - 47 %    MCV 90.8 78 - 100 FL    MCH 31.6 (H) 27 - 31 PG    MCHC 34.8 32.0 - 36.0 %    RDW 12.6 11.7 - 14.9 %    Platelets 370 811 - 464 K/CU MM    MPV 9.4 7.5 - 11.1 FL    Differential Type AUTOMATED DIFFERENTIAL     Segs Relative 67.5 (H) 36 - 66 %    Lymphocytes % 21.9 (L) 24 - 44 %    Monocytes % 8.7 (H) 0 - 4 %    Eosinophils % 1.1 0 - 3 %    Basophils % 0.5 0 - 1 %    Segs Absolute 5.0 K/CU MM    Lymphocytes Absolute 1.6 K/CU MM    Monocytes Absolute 0.7 K/CU MM    Eosinophils Absolute 0.1 K/CU MM    Basophils Absolute 0.0 K/CU MM    Nucleated RBC % 0.0 %    Total Nucleated RBC 0.0 K/CU MM    Total Immature Neutrophil 0.02 K/CU MM    Immature Neutrophil % 0.3 0 - 0.43 %   Prolactin    Collection Time: 11/13/22  7:00 AM   Result Value Ref Range    Prolactin 46.4 ng/ml   Lactate, Sepsis    Collection Time: 11/13/22  7:00 AM   Result Value Ref Range    Lactic Acid, Sepsis 1.6 0.5 - 1.9 mMOL/L   EKG 12 Lead    Collection Time: 11/13/22  7:01 AM   Result Value Ref Range    Ventricular Rate 57 BPM    Atrial Rate 57 BPM    P-R Interval 166 ms    QRS Duration 76 ms    Q-T Interval 490 ms    QTc Calculation (Bazett) 476 ms    P Axis 82 degrees    R Axis 6 degrees    T Axis 26 degrees    Diagnosis       Sinus bradycardia with premature atrial complexes with aberrant conduction  Otherwise normal ECG  When compared with ECG of 12-NOV-2022 10:21,  aberrant conduction is now present     Blood Gas, Venous    Collection Time: 11/13/22  7:15 AM   Result Value Ref Range    pH, Marcelino 7.47 (H) 7.32 - 7.42    pCO2, Marcelino 39 38 - 52 mmHG    pO2, Marcelino 230 (H) 28 - 48 mmHG    Base Exc, Mixed 4.5 (H) 0 - 2.3    HCO3, Venous 28.4 (H) 19 - 25 MMOL/L    O2 Sat, Marcelino 95.3 (H) 50 - 70 %    Comment VBG         Imaging/Diagnostics Last 24 Hours   CT Head W/O Contrast    Result Date: 11/12/2022  EXAMINATION: CTA OF THE HEAD AND NECK WITH CONTRAST; CT OF THE HEAD WITHOUT CONTRAST 11/12/2022 11:15 am; 11/12/2022 11:16 am: TECHNIQUE: CTA of the head and neck was performed with the administration of intravenous contrast. Multiplanar reformatted images are provided for review.   MIP images are provided for review. Stenosis of the internal carotid arteries measured using NASCET criteria. Automated exposure control, iterative reconstruction, and/or weight based adjustment of the mA/kV was utilized to reduce the radiation dose to as low as reasonably achievable.; CT of the head was performed without the administration of intravenous contrast. Automated exposure control, iterative reconstruction, and/or weight based adjustment of the mA/kV was utilized to reduce the radiation dose to as low as reasonably achievable. Noncontrast CT of the head with reconstructed 2-D images are also provided for review. COMPARISON: None. HISTORY: ORDERING SYSTEM PROVIDED HISTORY: Left Side weakness x 4 days, disarthria TECHNOLOGIST PROVIDED HISTORY: Reason for exam:->Left Side weakness x 4 days, disarthria Has a \"code stroke\" or \"stroke alert\" been called? ->No Decision Support Exception - unselect if not a suspected or confirmed emergency medical condition->Emergency Medical Condition (MA) Reason for Exam: Left Side weakness x 4 days, disarthria Additional signs and symptoms: 75ml Isovue 370; ORDERING SYSTEM PROVIDED HISTORY: Stroke sx x 4 days TECHNOLOGIST PROVIDED HISTORY: Reason for exam:->Stroke sx x 4 days Has a \"code stroke\" or \"stroke alert\" been called? ->No Decision Support Exception - unselect if not a suspected or confirmed emergency medical condition->Emergency Medical Condition (MA) Reason for Exam: Stroke sx x 4 days Initial evaluation. FINDINGS: CT HEAD: BRAIN/VENTRICLES: There is decreased attenuation involving the right basal ganglia. No acute intracranial hemorrhage or extraaxial fluid collection. Grey-white differentiation is maintained. No evidence of mass, mass effect or midline shift. No evidence of hydrocephalus. There are areas of hypoattenuation in the periventricular and subcortical white matter, which is nonspecific, but may represent chronic microvasvular ischemic change.   There appears to be a small chronic infarct in the right cerebellum. Minimal global parenchymal volume loss. Atherosclerosis of the intracranial vasculature is noted. ORBITS: The visualized portion of the orbits demonstrate no acute abnormality. SINUSES:  The visualized paranasal sinuses and mastoid air cells demonstrate no acute abnormality. SOFT TISSUES/SKULL: No acute abnormality of the visualized skull or soft tissues. CTA NECK: AORTIC ARCH/ARCH VESSELS: Scattered atherosclerosis seen of the aortic arch and arch vessels. Incidentally noted is a common origin of the innominate and right common carotid arteries, which is a normal variant. No significant stenosis seen of the innominate or subclavian arteries. CAROTID ARTERIES: No focal stenosis seen of the common carotid arteries. Minimal atherosclerosis involving the proximal cervical ICAs bilaterally. No flow limiting stenosis of the internal carotid arteries by NASCET criteria. No evidence of a dissection. VERTEBRAL ARTERIES: No dissection or flow limiting stenosis seen of the vertebral arteries. SOFT TISSUES: No focal consolidation within the visualized lung apices. No acute abnormality within the visualized superior mediastinum. BONES: No acute osseous abnormality. CTA HEAD: ANTERIOR CIRCULATION: Atherosclerosis seen of the internal carotid arteries bilaterally. This contributes to mild stenosis of the right supraclinoid ICA with moderate stenosis of the left supraclinoid ICA. There is a 1-2 mm outpouching arising from the left cervical ICA (sagittal series 605, image 114). There appears to be mild-to-moderate stenosis involving the A2/A3 segments bilaterally (sagittal MIP series 608, image 51). No significant stenosis seen of the middle cerebral arteries. POSTERIOR CIRCULATION: Moderate stenosis seen of the right P1 segment (coronal MIP series 607, image 49). There is mild stenosis of the left P1 segment (axial MIP series 606, image 41).   There is moderate focal stenosis involving the right P2/P3 segment (axial MIP series 606, image 44). No significant stenosis seen of the basilar or vertebral arteries. No aneurysm identified. 1. Decreased attenuation involving the right basal ganglia which may represent an age indeterminate infarct. 2. Otherwise, no acute intracranial abnormality. 3. Mild global parenchymal volume loss with chronic microvascular ischemic changes. 4. No flow limiting stenosis seen of the cervical carotid/vertebral arteries. 5. Atherosclerosis contributes to stenosis involving the supraclinoid ICAs bilaterally, mild on the right and moderate on the left. 6. Mild-to-moderate stenosis involving the A2/A3 segments of the anterior cerebral arteries bilaterally. 7. Moderate stenosis involving the P1 segment of the right posterior cerebral artery with moderate focal stenosis involving the right P2/P3 segment. 8. Mild stenosis involving the P1 segment of the left posterior cerebral artery. XR CHEST PORTABLE    Result Date: 11/12/2022  EXAMINATION: ONE XRAY VIEW OF THE CHEST 11/12/2022 11:38 am COMPARISON: 07/24/2022 HISTORY: ORDERING SYSTEM PROVIDED HISTORY: Stroke TECHNOLOGIST PROVIDED HISTORY: Reason for exam:->Stroke Reason for Exam: stroke FINDINGS: The lungs are without acute focal process. There is no effusion or pneumothorax. The cardiomediastinal silhouette is stable. The osseous structures are stable. No acute process. CTA HEAD NECK W CONTRAST    Result Date: 11/12/2022  EXAMINATION: CTA OF THE HEAD AND NECK WITH CONTRAST; CT OF THE HEAD WITHOUT CONTRAST 11/12/2022 11:15 am; 11/12/2022 11:16 am: TECHNIQUE: CTA of the head and neck was performed with the administration of intravenous contrast. Multiplanar reformatted images are provided for review. MIP images are provided for review. Stenosis of the internal carotid arteries measured using NASCET criteria.  Automated exposure control, iterative reconstruction, and/or weight based adjustment of the mA/kV was utilized to reduce the radiation dose to as low as reasonably achievable.; CT of the head was performed without the administration of intravenous contrast. Automated exposure control, iterative reconstruction, and/or weight based adjustment of the mA/kV was utilized to reduce the radiation dose to as low as reasonably achievable. Noncontrast CT of the head with reconstructed 2-D images are also provided for review. COMPARISON: None. HISTORY: ORDERING SYSTEM PROVIDED HISTORY: Left Side weakness x 4 days, disarthria TECHNOLOGIST PROVIDED HISTORY: Reason for exam:->Left Side weakness x 4 days, disarthria Has a \"code stroke\" or \"stroke alert\" been called? ->No Decision Support Exception - unselect if not a suspected or confirmed emergency medical condition->Emergency Medical Condition (MA) Reason for Exam: Left Side weakness x 4 days, disarthria Additional signs and symptoms: 75ml Isovue 370; ORDERING SYSTEM PROVIDED HISTORY: Stroke sx x 4 days TECHNOLOGIST PROVIDED HISTORY: Reason for exam:->Stroke sx x 4 days Has a \"code stroke\" or \"stroke alert\" been called? ->No Decision Support Exception - unselect if not a suspected or confirmed emergency medical condition->Emergency Medical Condition (MA) Reason for Exam: Stroke sx x 4 days Initial evaluation. FINDINGS: CT HEAD: BRAIN/VENTRICLES: There is decreased attenuation involving the right basal ganglia. No acute intracranial hemorrhage or extraaxial fluid collection. Grey-white differentiation is maintained. No evidence of mass, mass effect or midline shift. No evidence of hydrocephalus. There are areas of hypoattenuation in the periventricular and subcortical white matter, which is nonspecific, but may represent chronic microvasvular ischemic change. There appears to be a small chronic infarct in the right cerebellum. Minimal global parenchymal volume loss. Atherosclerosis of the intracranial vasculature is noted.  ORBITS: The visualized portion of the orbits demonstrate no acute abnormality. SINUSES:  The visualized paranasal sinuses and mastoid air cells demonstrate no acute abnormality. SOFT TISSUES/SKULL: No acute abnormality of the visualized skull or soft tissues. CTA NECK: AORTIC ARCH/ARCH VESSELS: Scattered atherosclerosis seen of the aortic arch and arch vessels. Incidentally noted is a common origin of the innominate and right common carotid arteries, which is a normal variant. No significant stenosis seen of the innominate or subclavian arteries. CAROTID ARTERIES: No focal stenosis seen of the common carotid arteries. Minimal atherosclerosis involving the proximal cervical ICAs bilaterally. No flow limiting stenosis of the internal carotid arteries by NASCET criteria. No evidence of a dissection. VERTEBRAL ARTERIES: No dissection or flow limiting stenosis seen of the vertebral arteries. SOFT TISSUES: No focal consolidation within the visualized lung apices. No acute abnormality within the visualized superior mediastinum. BONES: No acute osseous abnormality. CTA HEAD: ANTERIOR CIRCULATION: Atherosclerosis seen of the internal carotid arteries bilaterally. This contributes to mild stenosis of the right supraclinoid ICA with moderate stenosis of the left supraclinoid ICA. There is a 1-2 mm outpouching arising from the left cervical ICA (sagittal series 605, image 114). There appears to be mild-to-moderate stenosis involving the A2/A3 segments bilaterally (sagittal MIP series 608, image 51). No significant stenosis seen of the middle cerebral arteries. POSTERIOR CIRCULATION: Moderate stenosis seen of the right P1 segment (coronal MIP series 607, image 49). There is mild stenosis of the left P1 segment (axial MIP series 606, image 41). There is moderate focal stenosis involving the right P2/P3 segment (axial MIP series 606, image 44). No significant stenosis seen of the basilar or vertebral arteries. No aneurysm identified.      1. Decreased attenuation involving the right basal ganglia which may represent an age indeterminate infarct. 2. Otherwise, no acute intracranial abnormality. 3. Mild global parenchymal volume loss with chronic microvascular ischemic changes. 4. No flow limiting stenosis seen of the cervical carotid/vertebral arteries. 5. Atherosclerosis contributes to stenosis involving the supraclinoid ICAs bilaterally, mild on the right and moderate on the left. 6. Mild-to-moderate stenosis involving the A2/A3 segments of the anterior cerebral arteries bilaterally. 7. Moderate stenosis involving the P1 segment of the right posterior cerebral artery with moderate focal stenosis involving the right P2/P3 segment. 8. Mild stenosis involving the P1 segment of the left posterior cerebral artery. MRI brain without contrast    Result Date: 11/12/2022  EXAMINATION: MRI OF THE BRAIN WITHOUT CONTRAST  11/12/2022 5:59 pm TECHNIQUE: Multiplanar multisequence MRI of the brain was performed without the administration of intravenous contrast. COMPARISON: None. HISTORY: ORDERING SYSTEM PROVIDED HISTORY: decreased attenuation right basal ganglia TECHNOLOGIST PROVIDED HISTORY: Reason for exam:->decreased attenuation right basal ganglia Decision Support Exception - unselect if not a suspected or confirmed emergency medical condition->Emergency Medical Condition (MA) Reason for Exam: decreased attenuation rt basal ganglia FINDINGS: INTRACRANIAL STRUCTURES/VENTRICLES: There is an acute lacunar infarct within the right hemipons. A chronic infarct is present within the right cerebellar hemisphere. There is a chronic lacune in the right mid corona radiata. No mass effect or midline shift. No evidence of an acute intracranial hemorrhage. There is volume loss with mild chronic white matter microvascular ischemic change. The sellar/suprasellar regions appear unremarkable. The normal signal voids within the major intracranial vessels appear maintained.  ORBITS: The visualized portion of the orbits demonstrate no acute abnormality. SINUSES: The visualized paranasal sinuses and mastoid air cells demonstrate no acute abnormality. BONES/SOFT TISSUES: The bone marrow signal intensity appears normal. The soft tissues demonstrate no acute abnormality. Acute lacune within the keith. The findings were sent to the Radiology Results [address] at 6:07 pm on 11/12/2022 to be communicated to a licensed caregiver.      30 min of critical care time was spent on this patient    Electronically signed by Rosemarie Valencia MD on 11/13/2022 at 11:31 AM

## 2022-11-13 NOTE — CONSULTS
Neurology Service Consult Note  [id number]   Patient Name: Debbie Farah  : 1945        Subjective:   Reason for consult: I was so weak I decided to come to the ER  65 y.o. right-handed female with PMH listed below [facility] with complaints of weakness. Patient states that since Wednesday she has had weakness, with numbness on the left side, she noticed the left facial droop and mild slurred speech. She states yesterday she woke up, one day prior to this exam and started to do things around the house and just felt generally weak and could not complete her typical task, thus she came to the ER. Patient then while on the med-surg floor, today was having episodes of low heart rate, sinus bradycardia, as low as 31, and right before my exam today patient had a code blue as her heart rate dropped, they could not feel a pulse, compressions were given, AMS, but then came too requiring no intubation, shocks or life saving measures, she was transferred to ICU where I examined her. Patient denies any current chest pain, heart racing, lightheadedness. Patient does state before heart rate dropping patient felt lightheaded, tunnel vision, impending doom. Son and daughter in law at the bedside. She has never had a stroke in the past. Does not take ASA on daily basis. She has never had heart rate or cardiac issues. Cardiology on the case. Past Medical History:   Diagnosis Date    Anxiety      has OCD. He had a stroke.       Diabetes mellitus ([facility] [address]     Hypertension     :   Past Surgical History:   Procedure Laterality Date    CHOLECYSTECTOMY      WISDOM TOOTH EXTRACTION      all over the years     Medications:  Scheduled Meds:   enoxaparin  40 mg SubCUTAneous Daily    clopidogrel  75 mg Oral Daily    aspirin  81 mg Oral Daily    losartan  25 mg Oral Daily    therapeutic multivitamin-minerals  1 tablet Oral Daily    FLUoxetine  10 mg Oral QAM    insulin disorders    Physical Exam:       [unfilled]   Wt Readings from Last 3 Encounters:   11/12/22 145 lb (65.8 kg)   11/12/22 145 lb (65.8 kg)   07/28/22 151 lb 9.6 oz (68.8 kg)     Temp Readings from Last 3 Encounters:   11/13/22 97.8 °F (36.6 °C) (Oral)   07/24/22 97.8 °F (36.6 °C) (Oral)   03/22/22 96.1 °F (35.6 °C)     BP Readings from Last 3 Encounters:   11/13/22 (!) 152/73   07/28/22 (!) 142/70   07/24/22 (!) 161/90     Pulse Readings from Last 3 Encounters:   11/13/22 68   07/28/22 70   07/24/22 75        Gen: A&O x 4, NAD, cooperative  HEENT: NC/AT, EOMI, PERRL, mmm, neck supple, no meningeal signs; Heart: SB  Lungs: no distress  Ext: no edema, no calf tenderness b/l  Psych: normal mood and affect  Skin: no rashes or lesions    NEUROLOGIC EXAM:    Mental Status: A&O to self, location, month and year, NAD, speech clear, language fluent, repetition and naming intact, follows commands appropriately    Cranial Nerve Exam:   CN II-XII:  PERRL, VFF, no nystagmus, no gaze paresis, sensation V1-V3 intact b/l, muscles of facial expression asymmetric with left facial droop ; hearing intact to conversational tone, palate elevates symmetrically, shoulder elevation symmetric and tongue protrudes midline with movement side to side.     Motor Exam:       Strength 5/5 UE's/LE's b/l  Tone and bulk normal   No pronator drift    Deep Tendon Reflexes: 2/4 biceps, brachioradialis, patellar, and achilles b/l; flexor plantar responses b/l    Sensation: Intact light touch/vibration UE's/LE's b/l, all decreased in stocking distribution     Coordination/Cerebellum:       Tremors--none      Rapidly alternating movements: no dysdiadochokinesia b/l               Finger-to-Nose: no dysmetria b/l    Gait and stance:      Gait: deferred      LABS:     Recent Labs     11/12/22  1030 11/13/22  0700   WBC 8.1 7.4    138   K 3.8 3.9   CL 98* 101   CO2 24 25   BUN 15 14   CREATININE 0.7 0.7   GLUCOSE 127* 178*   INR 0.88  -- Hemoglobin a1c 6.7    IMAGING:    MRI brain:  Acute lacune within the keith. The findings were sent to the Radiology Results [address] at 6:07   pm on 11/12/2022 to be communicated to a licensed caregiver. CTA head and neck:  1. Decreased attenuation involving the right basal ganglia which may   represent an age indeterminate infarct. 2. Otherwise, no acute intracranial abnormality. 3. Mild global parenchymal volume loss with chronic microvascular ischemic   changes. 4. No flow limiting stenosis seen of the cervical carotid/vertebral arteries. 5. Atherosclerosis contributes to stenosis involving the supraclinoid ICAs   bilaterally, mild on the right and moderate on the left. 6. Mild-to-moderate stenosis involving the A2/A3 segments of the anterior   cerebral arteries bilaterally. 7. Moderate stenosis involving the P1 segment of the right posterior cerebral   artery with moderate focal stenosis involving the right P2/P3 segment. 8. Mild stenosis involving the P1 segment of the left posterior cerebral   artery. ECHO Pending       ASSESSMENT/PLAN:     76-year-old female with reported left unilateral weakness numbness left facial droop with associated syncopal episodes secondary to acute right pontine infarct superimposed on symptomatic episodes of sinus bradycardia. Plan of care as follows:  Neuro exam:  Left facial droop   Neurodiagnostics:  MRI brain as above  CTA head and neck as above   Echo pending   Medications:  DAPT x21 days   Statin   PT/OT/ST:  Per their recommendations   Follow-up:  Pending cardiology recommendations and course of admission         Thank you for allowing us to participate in the care of your patient. If there are any questions regarding evaluation please feel free to contact us. ANNETTE Hinds - CNP, 11/13/2022     Attending Note:  This was a shared/split visit with ANNETTE Diaz.  I have reviewed the chart and we have discussed this case in detail. The patient was seen and examined by myself. Pertinent labs and imaging have been personally reviewed. Changes were made to the note as appropriate. I concur with the above assessment and plan. Symptoms are relatively mild considering brainstem stroke, now 4 days out so should be near maximal point of swelling. She reports she feels really quite good the weakness is minimal. She notes she can walk but has trouble with her balance. ECHO currently finishing, pending. Needs DAPT+statin x 21 days then aspirin and statin thereafter (not on statin prior to admission, therefore feel reasonable can remain on daily aspirin with this new addition). Exam is quite reassuring.      Esme Ca,  11/13/2022 10:16 PM  Neurology

## 2022-11-13 NOTE — CARE COORDINATION
CM consult noted. Pt here for stroke like symptoms. Therapy ordered. WB posted requesting Evals and Recs to help with safe discharge planning.

## 2022-11-13 NOTE — CARE COORDINATION
Consult received for case management to assist with with family dynamics and discharge planning. CM deferred this consult for today as there was a code blue called on this pt this morning. CM to revisit at a more appropriate time.

## 2022-11-13 NOTE — CONSULTS
CARDIOLOGY CONSULT NOTE   Reason for consultation: Cardiac syncope    Referring physician:  Patrick Rashid MD     Primary care physician: Quinn Roche, APRN - CNP      Dear  Dr. Patrick Rashid MD   Thanks for the consult. Chief Complaints :  Chief Complaint   Patient presents with    Extremity Weakness     Left sided, started wednesday        History of present illness:Otilia is a 65 y. o.year old who presents with altered speech and strokelike symptoms she has been having dysarthria left-sided facial numbness ongoing for several days now in the emergency department was considered not a candidate for tPA MRI showed lacunar infarcts she was admitted overnight this morning nurse noted that she complained of getting dizzy lightheaded tunnel vision blacking out lost her pulse CPR was initiated without any furtherIntervention blood pressure and heart rate improved concerns of her getting bradycardic but telemetry strip shows sinus bradycardia with heart rate in 30s  Patient she felt that she was going to \"pass out everything became dark\"    Past medical history:    has a past medical history of Anxiety, Diabetes mellitus ([address] and Hypertension. Past surgical history:   has a past surgical history that includes [place] tooth extraction and Cholecystectomy. Social History:   reports that she has never smoked. She has never used smokeless tobacco. She reports that she does not drink alcohol and does not use drugs. Family history:   no family history of CAD, STROKE of DM at early age    Allergies   Allergen Reactions    Statins Other (See Comments)     Nausea, unsteady feeling, restless legs.         labetalol (NORMODYNE;TRANDATE) injection 10 mg, Q4H PRN  hydrALAZINE (APRESOLINE) injection 10 mg, Q6H PRN  ondansetron (ZOFRAN-ODT) disintegrating tablet 4 mg, Q8H PRN   Or  ondansetron (ZOFRAN) injection 4 mg, Q6H PRN  polyethylene glycol (GLYCOLAX) packet 17 g, Daily PRN  enoxaparin (LOVENOX) injection 40 mg, Daily  clopidogrel (PLAVIX) tablet 75 mg, Daily  aspirin EC tablet 81 mg, Daily  losartan (COZAAR) tablet 25 mg, Daily  therapeutic multivitamin-minerals 1 tablet, Daily  FLUoxetine (PROZAC) capsule 10 mg, QAM  glucose chewable tablet 16 g, PRN  dextrose bolus 10% 125 mL, PRN   Or  dextrose bolus 10% 250 mL, PRN  glucagon (rDNA) injection 1 mg, PRN  dextrose 10 % infusion, Continuous PRN  insulin lispro (HUMALOG) injection vial 0-4 Units, TID WC  insulin lispro (HUMALOG) injection vial 0-4 Units, Nightly  atorvastatin (LIPITOR) tablet 40 mg, Nightly  amLODIPine (NORVASC) tablet 5 mg, Daily  [Held by provider] rOPINIRole (REQUIP) tablet 0.5 mg, Nightly    Current Facility-Administered Medications   Medication Dose Route Frequency Provider Last Rate Last Admin    labetalol (NORMODYNE;TRANDATE) injection 10 mg  10 mg IntraVENous Q4H PRN Reshma Devlin MD        hydrALAZINE (APRESOLINE) injection 10 mg  10 mg IntraVENous Q6H PRN Reshma Devlin MD        ondansetron (ZOFRAN-ODT) disintegrating tablet 4 mg  4 mg Oral Q8H PRN Deann D Onyedumekwu, APRN - CNP        Or    ondansetron (ZOFRAN) injection 4 mg  4 mg IntraVENous Q6H PRN Deann D Onyedumekwu, APRN - CNP        polyethylene glycol (GLYCOLAX) packet 17 g  17 g Oral Daily PRN Deann D Onyedumekwu, APRN - CNP        enoxaparin (LOVENOX) injection 40 mg  40 mg SubCUTAneous Daily Deann D Onyedumekwu, APRN - CNP   40 mg at 11/12/22 1702    clopidogrel (PLAVIX) tablet 75 mg  75 mg Oral Daily Deann D Onyedumekwu, APRN - CNP   75 mg at 11/12/22 1909    aspirin EC tablet 81 mg  81 mg Oral Daily Deann D Onyedumekwu, APRN - CNP   81 mg at 11/12/22 1909    losartan (COZAAR) tablet 25 mg  25 mg Oral Daily Deann D Onyedumekwu, APRN - CNP   25 mg at 11/12/22 1910    therapeutic multivitamin-minerals 1 tablet  1 tablet Oral Daily Deann D Rubens APRN - CNP        FLUoxetine (PROZAC) capsule 10 mg  10 mg Oral QAM Deann D Onprem, ANNETTE - CNP        glucose chewable tablet 16 g  4 tablet Oral PRN Deann D Onyedumekwu, APRN - CNP        dextrose bolus 10% 125 mL  125 mL IntraVENous PRN Deann D Onyedumekwu, APRN - CNP        Or    dextrose bolus 10% 250 mL  250 mL IntraVENous PRN Deann D Onyedumekwu, APRN - CNP        glucagon (rDNA) injection 1 mg  1 mg SubCUTAneous PRN Deann D Onyedumekwu, APRN - CNP        dextrose 10 % infusion   IntraVENous Continuous PRN Deann D Onyedumekwu, APRN - CNP        insulin lispro (HUMALOG) injection vial 0-4 Units  0-4 Units SubCUTAneous TID WC Deann D Onyedumekwu, APRN - CNP        insulin lispro (HUMALOG) injection vial 0-4 Units  0-4 Units SubCUTAneous Nightly Deann D Onyedumekwu, APRN - CNP        atorvastatin (LIPITOR) tablet 40 mg  40 mg Oral Nightly Rob Pierce MD   40 mg at 11/12/22 2137    amLODIPine (NORVASC) tablet 5 mg  5 mg Oral Daily Rob Pierce MD        [Held by provider] rOPINIRole (REQUIP) tablet 0.5 mg  0.5 mg Oral Nightly Rob Pierce MD   0.5 mg at 11/13/22 0507     Review of Systems:   Constitutional: No Fever or Weight Loss   Eyes: No Decreased Vision  ENT: No Headaches, Hearing Loss or Vertigo  Cardiovascular: As per HPI  Respiratory: As per HPI  Gastrointestinal: No abdominal pain, appetite loss, blood in stools, constipation, diarrhea or heartburn  Genitourinary: No dysuria, trouble voiding, or hematuria  Musculoskeletal:  No gait disturbance, weakness or joint complaints  Integumentary: No rash or pruritis  Neurological: No TIA or stroke symptoms  Psychiatric: No anxiety or depression  Endocrine: No malaise, fatigue or temperature intolerance  Hematologic/Lymphatic: No bleeding problems, blood clots or swollen lymph nodes  Allergic/Immunologic: No nasal congestion or hives  All systems negative except as marked.         Physical Examination:    Vitals:    11/13/22 0808 11/13/22 0815 11/13/22 0830 11/13/22 1000   BP: 99/86 (!) 139/57 134/85 (!) 152/73   Pulse: (!) 47 58 61 68   Resp: 21   16   Temp:    97.8 °F (36.6 °C)   TempSrc: Oral   SpO2: 99%   100%   Weight:       Height:           General Appearance:  No distress, conversant    Constitutional:  Well developed, Well nourished, No acute distress, Non-toxic appearance. HENT:  Normocephalic, Atraumatic, Bilateral external ears normal, Oropharynx moist, No oral exudates, Nose normal. Neck- Normal range of motion, No tenderness, Supple, No stridor,no apical-carotid delay  Lymphatics : no palpable lymph nodes  Eyes:  PERRL, EOMI, Conjunctiva normal, No discharge. Respiratory:  Normal breath sounds, No respiratory distress, No wheezing, No chest tenderness. ,no use of accessory muscles, crackles Absent   Cardiovascular: (PMI) apex non displaced,no lifts no thrills, ankle swelling Absent  , 1+, s1 and s2 audible,Murmur. Absent , JVD not noted    Abdomen /GI:  Bowel sounds normal, Soft, No tenderness, No masses, No gross visceromegaly   :  No costovertebral angle tenderness   Musculoskeletal:  No edema, no tenderness, no deformities. Back- no tenderness  Integument:  Well hydrated, no rash   Lymphatic:  No lymphadenopathy noted   Neurologic:  Alert & oriented x 3, CN 2-12 normal, normal motor function, normal sensory function, no focal deficits noted           Medical decision making and Data review:    Lab Review   Recent Labs     11/13/22  0700   WBC 7.4   HGB 13.4   HCT 38.5         Recent Labs     11/13/22  0700      K 3.9      CO2 25   BUN 14   CREATININE 0.7     Recent Labs     11/12/22  1030   AST 15   ALT 14   BILITOT 0.4   ALKPHOS 98     Recent Labs     11/12/22  1030   TROPONINT <0.010       No results for input(s): PROBNP in the last 72 hours.   Lab Results   Component Value Date    INR 0.88 11/12/2022    PROTIME 11.3 (L) 11/12/2022       EKG: (reviewed by myself)    ECHO:(reviewed by myself)    Chest Xray:(reviewed by myself)  CT Head W/O Contrast    Result Date: 11/12/2022  EXAMINATION: CTA OF THE HEAD AND NECK WITH CONTRAST; CT OF THE HEAD WITHOUT CONTRAST 11/12/2022 11:15 am; 11/12/2022 11:16 am: TECHNIQUE: CTA of the head and neck was performed with the administration of intravenous contrast. Multiplanar reformatted images are provided for review. MIP images are provided for review. Stenosis of the internal carotid arteries measured using NASCET criteria. Automated exposure control, iterative reconstruction, and/or weight based adjustment of the mA/kV was utilized to reduce the radiation dose to as low as reasonably achievable.; CT of the head was performed without the administration of intravenous contrast. Automated exposure control, iterative reconstruction, and/or weight based adjustment of the mA/kV was utilized to reduce the radiation dose to as low as reasonably achievable. Noncontrast CT of the head with reconstructed 2-D images are also provided for review. COMPARISON: None. HISTORY: ORDERING SYSTEM PROVIDED HISTORY: Left Side weakness x 4 days, disarthria TECHNOLOGIST PROVIDED HISTORY: Reason for exam:->Left Side weakness x 4 days, disarthria Has a \"code stroke\" or \"stroke alert\" been called? ->No Decision Support Exception - unselect if not a suspected or confirmed emergency medical condition->Emergency Medical Condition (MA) Reason for Exam: Left Side weakness x 4 days, disarthria Additional signs and symptoms: 75ml Isovue 370; ORDERING SYSTEM PROVIDED HISTORY: Stroke sx x 4 days TECHNOLOGIST PROVIDED HISTORY: Reason for exam:->Stroke sx x 4 days Has a \"code stroke\" or \"stroke alert\" been called? ->No Decision Support Exception - unselect if not a suspected or confirmed emergency medical condition->Emergency Medical Condition (MA) Reason for Exam: Stroke sx x 4 days Initial evaluation. FINDINGS: CT HEAD: BRAIN/VENTRICLES: There is decreased attenuation involving the right basal ganglia. No acute intracranial hemorrhage or extraaxial fluid collection. Grey-white differentiation is maintained. No evidence of mass, mass effect or midline shift.   No evidence of hydrocephalus. There are areas of hypoattenuation in the periventricular and subcortical white matter, which is nonspecific, but may represent chronic microvasvular ischemic change. There appears to be a small chronic infarct in the right cerebellum. Minimal global parenchymal volume loss. Atherosclerosis of the intracranial vasculature is noted. ORBITS: The visualized portion of the orbits demonstrate no acute abnormality. SINUSES:  The visualized paranasal sinuses and mastoid air cells demonstrate no acute abnormality. SOFT TISSUES/SKULL: No acute abnormality of the visualized skull or soft tissues. CTA NECK: AORTIC ARCH/ARCH VESSELS: Scattered atherosclerosis seen of the aortic arch and arch vessels. Incidentally noted is a common origin of the innominate and right common carotid arteries, which is a normal variant. No significant stenosis seen of the innominate or subclavian arteries. CAROTID ARTERIES: No focal stenosis seen of the common carotid arteries. Minimal atherosclerosis involving the proximal cervical ICAs bilaterally. No flow limiting stenosis of the internal carotid arteries by NASCET criteria. No evidence of a dissection. VERTEBRAL ARTERIES: No dissection or flow limiting stenosis seen of the vertebral arteries. SOFT TISSUES: No focal consolidation within the visualized lung apices. No acute abnormality within the visualized superior mediastinum. BONES: No acute osseous abnormality. CTA HEAD: ANTERIOR CIRCULATION: Atherosclerosis seen of the internal carotid arteries bilaterally. This contributes to mild stenosis of the right supraclinoid ICA with moderate stenosis of the left supraclinoid ICA. There is a 1-2 mm outpouching arising from the left cervical ICA (sagittal series 605, image 114). There appears to be mild-to-moderate stenosis involving the A2/A3 segments bilaterally (sagittal MIP series 608, image 51). No significant stenosis seen of the middle cerebral arteries. POSTERIOR CIRCULATION: Moderate stenosis seen of the right P1 segment (coronal MIP series 607, image 49). There is mild stenosis of the left P1 segment (axial MIP series 606, image 41). There is moderate focal stenosis involving the right P2/P3 segment (axial MIP series 606, image 44). No significant stenosis seen of the basilar or vertebral arteries. No aneurysm identified. 1. Decreased attenuation involving the right basal ganglia which may represent an age indeterminate infarct. 2. Otherwise, no acute intracranial abnormality. 3. Mild global parenchymal volume loss with chronic microvascular ischemic changes. 4. No flow limiting stenosis seen of the cervical carotid/vertebral arteries. 5. Atherosclerosis contributes to stenosis involving the supraclinoid ICAs bilaterally, mild on the right and moderate on the left. 6. Mild-to-moderate stenosis involving the A2/A3 segments of the anterior cerebral arteries bilaterally. 7. Moderate stenosis involving the P1 segment of the right posterior cerebral artery with moderate focal stenosis involving the right P2/P3 segment. 8. Mild stenosis involving the P1 segment of the left posterior cerebral artery. XR CHEST PORTABLE    Result Date: 11/12/2022  EXAMINATION: ONE XRAY VIEW OF THE CHEST 11/12/2022 11:38 am COMPARISON: 07/24/2022 HISTORY: ORDERING SYSTEM PROVIDED HISTORY: Stroke TECHNOLOGIST PROVIDED HISTORY: Reason for exam:->Stroke Reason for Exam: stroke FINDINGS: The lungs are without acute focal process. There is no effusion or pneumothorax. The cardiomediastinal silhouette is stable. The osseous structures are stable. No acute process.      CTA HEAD NECK W CONTRAST    Result Date: 11/12/2022  EXAMINATION: CTA OF THE HEAD AND NECK WITH CONTRAST; CT OF THE HEAD WITHOUT CONTRAST 11/12/2022 11:15 am; 11/12/2022 11:16 am: TECHNIQUE: CTA of the head and neck was performed with the administration of intravenous contrast. Multiplanar reformatted images are provided for review. MIP images are provided for review. Stenosis of the internal carotid arteries measured using NASCET criteria. Automated exposure control, iterative reconstruction, and/or weight based adjustment of the mA/kV was utilized to reduce the radiation dose to as low as reasonably achievable.; CT of the head was performed without the administration of intravenous contrast. Automated exposure control, iterative reconstruction, and/or weight based adjustment of the mA/kV was utilized to reduce the radiation dose to as low as reasonably achievable. Noncontrast CT of the head with reconstructed 2-D images are also provided for review. COMPARISON: None. HISTORY: ORDERING SYSTEM PROVIDED HISTORY: Left Side weakness x 4 days, disarthria TECHNOLOGIST PROVIDED HISTORY: Reason for exam:->Left Side weakness x 4 days, disarthria Has a \"code stroke\" or \"stroke alert\" been called? ->No Decision Support Exception - unselect if not a suspected or confirmed emergency medical condition->Emergency Medical Condition (MA) Reason for Exam: Left Side weakness x 4 days, disarthria Additional signs and symptoms: 75ml Isovue 370; ORDERING SYSTEM PROVIDED HISTORY: Stroke sx x 4 days TECHNOLOGIST PROVIDED HISTORY: Reason for exam:->Stroke sx x 4 days Has a \"code stroke\" or \"stroke alert\" been called? ->No Decision Support Exception - unselect if not a suspected or confirmed emergency medical condition->Emergency Medical Condition (MA) Reason for Exam: Stroke sx x 4 days Initial evaluation. FINDINGS: CT HEAD: BRAIN/VENTRICLES: There is decreased attenuation involving the right basal ganglia. No acute intracranial hemorrhage or extraaxial fluid collection. Grey-white differentiation is maintained. No evidence of mass, mass effect or midline shift. No evidence of hydrocephalus.   There are areas of hypoattenuation in the periventricular and subcortical white matter, which is nonspecific, but may represent chronic microvasvular ischemic change. There appears to be a small chronic infarct in the right cerebellum. Minimal global parenchymal volume loss. Atherosclerosis of the intracranial vasculature is noted. ORBITS: The visualized portion of the orbits demonstrate no acute abnormality. SINUSES:  The visualized paranasal sinuses and mastoid air cells demonstrate no acute abnormality. SOFT TISSUES/SKULL: No acute abnormality of the visualized skull or soft tissues. CTA NECK: AORTIC ARCH/ARCH VESSELS: Scattered atherosclerosis seen of the aortic arch and arch vessels. Incidentally noted is a common origin of the innominate and right common carotid arteries, which is a normal variant. No significant stenosis seen of the innominate or subclavian arteries. CAROTID ARTERIES: No focal stenosis seen of the common carotid arteries. Minimal atherosclerosis involving the proximal cervical ICAs bilaterally. No flow limiting stenosis of the internal carotid arteries by NASCET criteria. No evidence of a dissection. VERTEBRAL ARTERIES: No dissection or flow limiting stenosis seen of the vertebral arteries. SOFT TISSUES: No focal consolidation within the visualized lung apices. No acute abnormality within the visualized superior mediastinum. BONES: No acute osseous abnormality. CTA HEAD: ANTERIOR CIRCULATION: Atherosclerosis seen of the internal carotid arteries bilaterally. This contributes to mild stenosis of the right supraclinoid ICA with moderate stenosis of the left supraclinoid ICA. There is a 1-2 mm outpouching arising from the left cervical ICA (sagittal series 605, image 114). There appears to be mild-to-moderate stenosis involving the A2/A3 segments bilaterally (sagittal MIP series 608, image 51). No significant stenosis seen of the middle cerebral arteries. POSTERIOR CIRCULATION: Moderate stenosis seen of the right P1 segment (coronal MIP series 607, image 49).   There is mild stenosis of the left P1 segment (axial MIP series 606, image 41). There is moderate focal stenosis involving the right P2/P3 segment (axial MIP series 606, image 44). No significant stenosis seen of the basilar or vertebral arteries. No aneurysm identified. 1. Decreased attenuation involving the right basal ganglia which may represent an age indeterminate infarct. 2. Otherwise, no acute intracranial abnormality. 3. Mild global parenchymal volume loss with chronic microvascular ischemic changes. 4. No flow limiting stenosis seen of the cervical carotid/vertebral arteries. 5. Atherosclerosis contributes to stenosis involving the supraclinoid ICAs bilaterally, mild on the right and moderate on the left. 6. Mild-to-moderate stenosis involving the A2/A3 segments of the anterior cerebral arteries bilaterally. 7. Moderate stenosis involving the P1 segment of the right posterior cerebral artery with moderate focal stenosis involving the right P2/P3 segment. 8. Mild stenosis involving the P1 segment of the left posterior cerebral artery. MRI brain without contrast    Result Date: 11/12/2022  EXAMINATION: MRI OF THE BRAIN WITHOUT CONTRAST  11/12/2022 5:59 pm TECHNIQUE: Multiplanar multisequence MRI of the brain was performed without the administration of intravenous contrast. COMPARISON: None. HISTORY: ORDERING SYSTEM PROVIDED HISTORY: decreased attenuation right basal ganglia TECHNOLOGIST PROVIDED HISTORY: Reason for exam:->decreased attenuation right basal ganglia Decision Support Exception - unselect if not a suspected or confirmed emergency medical condition->Emergency Medical Condition (MA) Reason for Exam: decreased attenuation rt basal ganglia FINDINGS: INTRACRANIAL STRUCTURES/VENTRICLES: There is an acute lacunar infarct within the right hemipons. A chronic infarct is present within the right cerebellar hemisphere. There is a chronic lacune in the right mid corona radiata. No mass effect or midline shift. No evidence of an acute intracranial hemorrhage. There is volume loss with mild chronic white matter microvascular ischemic change. The sellar/suprasellar regions appear unremarkable. The normal signal voids within the major intracranial vessels appear maintained. ORBITS: The visualized portion of the orbits demonstrate no acute abnormality. SINUSES: The visualized paranasal sinuses and mastoid air cells demonstrate no acute abnormality. BONES/SOFT TISSUES: The bone marrow signal intensity appears normal. The soft tissues demonstrate no acute abnormality. Acute lacune within the keith. The findings were sent to the Radiology Results [address] at 6:07 pm on 11/12/2022 to be communicated to a licensed caregiver. All labs, medications and tests reviewed by myself including data  from outside source , patient and available family . Continue all other medications of all above medical condition listed as is. Impression:  Principal Problem:    Stroke-like symptoms  Active Problems:    Bradycardia    Type 2 diabetes mellitus without complication, without long-term current use of insulin ([facility])  Resolved Problems:    * No resolved hospital problems. *      Assessment: 65 y. o.year old with PMH of  has a past medical history of Anxiety, Diabetes mellitus ([facility] [address] and Hypertension. Plan and Recommendations:    Bradycardia syncope like event could be a vagal event? Due to the stroke?   Check TSH  Continue to monitor on telemetry can be moved to ICU for closer monitoring but at this point no indication for temporary pacemaker she was noted to have sinus bradycardia on telemetry with no heart block and heart rate was in 30s  Avoid rate lowering medication  Lateral carotid abnormal intracranial coronary vessels have moderate stenosis  Acute CVA we will get echo  Plan outpatient 30-day monitor  Uncontrolled hypertension to have erratic blood pressure getting from 170s to 80s continue to monitor for now started on amlodipine 5 mg daily and losartan 25 mg daily continue to monitor  6. Dyslipidemia: continue statins   DVT prophylaxis          Thank you  much for consult and giving us the opportunity in contributing in the care of this patient. Please feel free to call me for any questions.        Jessica Stock MD, 11/13/2022 10:31 AM

## 2022-11-14 PROBLEM — G45.9 TIA (TRANSIENT ISCHEMIC ATTACK): Status: ACTIVE | Noted: 2022-11-14

## 2022-11-14 LAB
ANION GAP SERPL CALCULATED.3IONS-SCNC: 13 MMOL/L (ref 4–16)
BASOPHILS ABSOLUTE: 0 K/CU MM
BASOPHILS RELATIVE PERCENT: 0.3 % (ref 0–1)
BUN BLDV-MCNC: 13 MG/DL (ref 6–23)
CALCIUM SERPL-MCNC: 9.1 MG/DL (ref 8.3–10.6)
CHLORIDE BLD-SCNC: 104 MMOL/L (ref 99–110)
CO2: 24 MMOL/L (ref 21–32)
CREAT SERPL-MCNC: 0.7 MG/DL (ref 0.6–1.1)
DIFFERENTIAL TYPE: ABNORMAL
EKG ATRIAL RATE: 76 BPM
EKG ATRIAL RATE: 88 BPM
EKG DIAGNOSIS: NORMAL
EKG DIAGNOSIS: NORMAL
EKG P AXIS: 0 DEGREES
EKG P AXIS: 74 DEGREES
EKG P-R INTERVAL: 144 MS
EKG P-R INTERVAL: 174 MS
EKG Q-T INTERVAL: 380 MS
EKG Q-T INTERVAL: 410 MS
EKG QRS DURATION: 70 MS
EKG QRS DURATION: 88 MS
EKG QTC CALCULATION (BAZETT): 459 MS
EKG QTC CALCULATION (BAZETT): 461 MS
EKG R AXIS: 10 DEGREES
EKG R AXIS: 15 DEGREES
EKG T AXIS: 34 DEGREES
EKG T AXIS: 49 DEGREES
EKG VENTRICULAR RATE: 76 BPM
EKG VENTRICULAR RATE: 88 BPM
EOSINOPHILS ABSOLUTE: 0 K/CU MM
EOSINOPHILS RELATIVE PERCENT: 0.1 % (ref 0–3)
GFR SERPL CREATININE-BSD FRML MDRD: >60 ML/MIN/1.73M2
GLUCOSE BLD-MCNC: 176 MG/DL (ref 70–99)
GLUCOSE BLD-MCNC: 188 MG/DL (ref 70–99)
GLUCOSE BLD-MCNC: 192 MG/DL (ref 70–99)
GLUCOSE BLD-MCNC: 195 MG/DL (ref 70–99)
GLUCOSE BLD-MCNC: 284 MG/DL (ref 70–99)
HCT VFR BLD CALC: 38.6 % (ref 37–47)
HEMOGLOBIN: 13.2 GM/DL (ref 12.5–16)
IMMATURE NEUTROPHIL %: 0.3 % (ref 0–0.43)
LYMPHOCYTES ABSOLUTE: 1 K/CU MM
LYMPHOCYTES RELATIVE PERCENT: 9.4 % (ref 24–44)
MAGNESIUM: 1.9 MG/DL (ref 1.8–2.4)
MCH RBC QN AUTO: 31.1 PG (ref 27–31)
MCHC RBC AUTO-ENTMCNC: 34.2 % (ref 32–36)
MCV RBC AUTO: 91 FL (ref 78–100)
MONOCYTES ABSOLUTE: 0.6 K/CU MM
MONOCYTES RELATIVE PERCENT: 5.9 % (ref 0–4)
NUCLEATED RBC %: 0 %
PDW BLD-RTO: 12.4 % (ref 11.7–14.9)
PLATELET # BLD: 340 K/CU MM (ref 140–440)
PMV BLD AUTO: 9.7 FL (ref 7.5–11.1)
POTASSIUM SERPL-SCNC: 3.9 MMOL/L (ref 3.5–5.1)
RBC # BLD: 4.24 M/CU MM (ref 4.2–5.4)
SEGMENTED NEUTROPHILS ABSOLUTE COUNT: 8.7 K/CU MM
SEGMENTED NEUTROPHILS RELATIVE PERCENT: 84 % (ref 36–66)
SODIUM BLD-SCNC: 141 MMOL/L (ref 135–145)
TOTAL IMMATURE NEUTOROPHIL: 0.03 K/CU MM
TOTAL NUCLEATED RBC: 0 K/CU MM
TROPONIN T: 0.01 NG/ML
TROPONIN T: 0.01 NG/ML
TROPONIN T: <0.01 NG/ML
WBC # BLD: 10.4 K/CU MM (ref 4–10.5)

## 2022-11-14 PROCEDURE — 97162 PT EVAL MOD COMPLEX 30 MIN: CPT

## 2022-11-14 PROCEDURE — 97530 THERAPEUTIC ACTIVITIES: CPT

## 2022-11-14 PROCEDURE — 84484 ASSAY OF TROPONIN QUANT: CPT

## 2022-11-14 PROCEDURE — 92610 EVALUATE SWALLOWING FUNCTION: CPT

## 2022-11-14 PROCEDURE — 80048 BASIC METABOLIC PNL TOTAL CA: CPT

## 2022-11-14 PROCEDURE — 93010 ELECTROCARDIOGRAM REPORT: CPT | Performed by: INTERNAL MEDICINE

## 2022-11-14 PROCEDURE — 6370000000 HC RX 637 (ALT 250 FOR IP): Performed by: STUDENT IN AN ORGANIZED HEALTH CARE EDUCATION/TRAINING PROGRAM

## 2022-11-14 PROCEDURE — 97116 GAIT TRAINING THERAPY: CPT

## 2022-11-14 PROCEDURE — 83735 ASSAY OF MAGNESIUM: CPT

## 2022-11-14 PROCEDURE — 99233 SBSQ HOSP IP/OBS HIGH 50: CPT | Performed by: INTERNAL MEDICINE

## 2022-11-14 PROCEDURE — 82962 GLUCOSE BLOOD TEST: CPT

## 2022-11-14 PROCEDURE — 93005 ELECTROCARDIOGRAM TRACING: CPT | Performed by: STUDENT IN AN ORGANIZED HEALTH CARE EDUCATION/TRAINING PROGRAM

## 2022-11-14 PROCEDURE — 96372 THER/PROPH/DIAG INJ SC/IM: CPT

## 2022-11-14 PROCEDURE — 99233 SBSQ HOSP IP/OBS HIGH 50: CPT | Performed by: NURSE PRACTITIONER

## 2022-11-14 PROCEDURE — 97166 OT EVAL MOD COMPLEX 45 MIN: CPT

## 2022-11-14 PROCEDURE — 6360000002 HC RX W HCPCS

## 2022-11-14 PROCEDURE — 94761 N-INVAS EAR/PLS OXIMETRY MLT: CPT

## 2022-11-14 PROCEDURE — 36415 COLL VENOUS BLD VENIPUNCTURE: CPT

## 2022-11-14 PROCEDURE — 1200000000 HC SEMI PRIVATE

## 2022-11-14 PROCEDURE — 6370000000 HC RX 637 (ALT 250 FOR IP)

## 2022-11-14 PROCEDURE — 85025 COMPLETE CBC W/AUTO DIFF WBC: CPT

## 2022-11-14 RX ORDER — LOSARTAN POTASSIUM 25 MG/1
50 TABLET ORAL DAILY
Status: DISCONTINUED | OUTPATIENT
Start: 2022-11-15 | End: 2022-11-15 | Stop reason: HOSPADM

## 2022-11-14 RX ORDER — LORAZEPAM 1 MG/1
1 TABLET ORAL EVERY 4 HOURS PRN
Status: DISCONTINUED | OUTPATIENT
Start: 2022-11-14 | End: 2022-11-15 | Stop reason: HOSPADM

## 2022-11-14 RX ADMIN — FLUOXETINE 10 MG: 10 CAPSULE ORAL at 09:38

## 2022-11-14 RX ADMIN — CLOPIDOGREL BISULFATE 75 MG: 75 TABLET ORAL at 09:38

## 2022-11-14 RX ADMIN — Medication 1 TABLET: at 09:38

## 2022-11-14 RX ADMIN — AMLODIPINE BESYLATE 5 MG: 5 TABLET ORAL at 09:38

## 2022-11-14 RX ADMIN — ENOXAPARIN SODIUM 40 MG: 100 INJECTION SUBCUTANEOUS at 09:37

## 2022-11-14 RX ADMIN — ASPIRIN 81 MG: 81 TABLET, COATED ORAL at 09:37

## 2022-11-14 RX ADMIN — ATORVASTATIN CALCIUM 40 MG: 40 TABLET, FILM COATED ORAL at 20:30

## 2022-11-14 RX ADMIN — LOSARTAN POTASSIUM 25 MG: 25 TABLET, FILM COATED ORAL at 09:37

## 2022-11-14 ASSESSMENT — PAIN SCALES - GENERAL: PAINLEVEL_OUTOF10: 0

## 2022-11-14 NOTE — PROGRESS NOTES
Called into pt room. Pt complaining of being clammy, tremors, and having anxiety. Full assessment done. No changes outside of symptoms lsited above. EKG done. Dr. Nito Alfaro and [facility] notified. See orders. VSS. Will continue to monitor.

## 2022-11-14 NOTE — PROGRESS NOTES
[facility] THERAPY EVALUATION    Remi Mckenzie, 1945, 2129/2129-A, 11/14/2022    Discharge Recommendation: home with initial 24hr supervision, OP OT    History:  False Pass:  The encounter diagnosis was Cerebrovascular accident (CVA), unspecified mechanism ([facility] [address] Patient  has a past medical history of Anxiety, Diabetes mellitus ([address] and Hypertension. Patient  has a past surgical history that includes [place] tooth extraction and Cholecystectomy. Subjective:  Patient states: \"I'm not used to staying in bed, that's not me. \"  Pain: denies  Communication with other providers: cleared with nursing, coeval with PT Aixa Holt, handoff to CM  Restrictions: general precautions, fall risk  daughter at bedside    Home Setup/Prior level of function:  Social/Functional History  Lives With: Spouse  Type of Home: House  Home Layout: Laundry in basement, Two level  Home Access: Stairs to enter without rails  Entrance Stairs - Number of Steps: 3  Entrance Stairs - Rails: None  Bathroom Shower/Tub: Walk-in shower  Bathroom Toilet: Standard  Bathroom Equipment:  (no bathroom DME)  Home Equipment:  (no AD)  ADL Assistance: Independent  Homemaking Assistance: Independent  Ambulation Assistance: Independent (typically doesn't use AD)  Transfer Assistance: Independent  Active : Yes  Occupation: Caregiver  Additional Comments: 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 Typically very active and independent. Examination:  Observation: Pt was semi fowlers in bed upon arrival, agreeable to session  Vision: [facility]  Hearing: WFL  Objective Measures: 151/72 (97) semi fowlers in bed, stable at rest. 172/84 (111) EOB.     Body Systems and functions:  ROM: WFL in BUE  Strength: 4-/5 LUE, 4/5 RUE  Sensation: WFL  Tone: normotonic in BUE  Coordination: movements fluid and coordinated  Activity Tolerance: fair    Activities of Daily Living (ADLs):  Feeding: ind  Grooming: CGA standing at sink  Toileting: CGA  UB dressing/bathing: SBA  LB dressing/bathing: CGA    *pt ADL function inferred from gross functional assessment of mobility, balance, posture, safety awareness, activity tolerance (unless otherwise indicated)    Cognitive and Psychosocial Functioning:  Overall cognitive status: A/Ox4, WNL. Pt very prideful of her level of independence, impacts safety/insight 2/2 to this. Pt with minimal carryover for edu on AD/safety. Pt states she does have minimal difficulty with wordfinding and processing delays residual from stroke. Affect: pleasant    Functional Mobility:  Bed Mobility: SBA  Sit <> Stand: CGA    Ambulation: CGA using no AD      AM-PAC 6 click short form for inpatient daily activity:   How much help from another person does the patient currently need. .. Unable  Dep A Lot  Max A A Lot   Mod A A Little  Min A A Little   CGA  SBA None   Mod I  Indep  Sup   1. Putting on and taking off regular lower body clothing? [] 1    [] 2   [] 2   [] 3   [x] 3   [] 4      2. Bathing (including washing, rinsing, drying)? [] 1   [] 2   [] 2 [] 3 [x] 3 [] 4   3. Toileting, which includes using toilet, bedpan, or urinal? [] 1    [] 2   [] 2   [] 3   [x] 3   [] 4     4. Putting on and taking off regular upper body clothing? [] 1   [] 2   [] 2   [] 3   [] 3    [x] 4      5. Taking care of personal grooming such as brushing teeth? [] 1   [] 2    [] 2 [] 3    [x] 3   [] 4      6. Eating meals? [] 1   [] 2   [] 2   [] 3   [] 3   [x] 4        Raw Score:  20      24/24 = unimpaired  23/24 = 1-20% impaired   20/24-22/24 = 21-40% impaired  15/24-19/24 = 41-59% impaired   10/24-14/24 = 60%-79% impaired  7/24-9/24 = 80%-99% impaired  6/24 = 100% impaired     Treatment:    Therapeutic Activity Training (10 minutes): Therapeutic activity training was instructed today. Cues were given for safety, sequence, UE/LE placement, visual cues, and balance.     Activities performed today included pt demo supine to sit EOB with SBA, standing with CGA. Pt ambulating good functional distance in hallway with CGA using no AD. Pt slightly unsteady with ambulation, few minimal LOB but pt self-correcting (see PT note for further gait analysis). Pt returning to room, sitting in chair with CGA. Pt setup for breakfast. Handoff to , rewarmed pt coffee. Education: Role of OT, OT POC, discharge needs, safety, benefits of EOB/OOB activity, AD/DME needs, Home safety  Safety Measures: Gait belt used for safety of pt and therapist, Left in recliner, Alarm in place, call light and phone within reach, lines managed    Assessment:  Pt is a 65 yr old female from home with spouse who presents with stroke. 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 Pt currently slightly below baseline, ind-CGA for ADLs, CGA for mobility using no AD. Pt also presents with generalized weakness, activity tolerance, impaired mobility/balance. Pt would benefit from continued IP OT services during their stay and discharge to home with initial 24hr support and OP OT.     Complexity: mod  Prognosis: good  Barriers: balance, deconditioning    Plan:  Plan: 3+/week    Treatment to include: Strengthening, ROM, Balance Training, Functional Mobility Training, Endurance Training, Gait Training, Pain Management, Safety Education and Training, Patient+Caregiver Education and Training, Equipment Evaluation Education and Procurement, Positioning, Self Care Training, Home Management Training, Coordination Training    Pt Would Benefit from Continued Edu on safety  Adaptive Equipment Recommendations: none    Goals:  Time frame for goals: 2 weeks  Pt will complete feeding tasks with ind  Pt will complete grooming tasks with ind standing at sink  Pt will complete toileting tasks with ind using standard commode  Pt will complete UB dressing and bathing tasks with ind  Pt will complete LB dressing and bathing tasks with ind  Pt will complete therapeutic exercise/activity to increase independence in ADL/IADL function  Pt will practice functional transfers and mobility with AD for increased safety and independence    Time:   Time in: 0849  Time out: 0919  Treatment Minutes: 10  Evaluation Minutes: 10  Total time: 20    Electronically signed by:      YULISSA Daniels  11/14/2022, 8:55 AM

## 2022-11-14 NOTE — CARE COORDINATION
Met with patient for discharge planning. She is awake and able to participate. Discussed PT/OT recommendations of home with OP PT/OT. She is agreeable and plans home with family support. No further CM needs.  Arianna Blanc RN

## 2022-11-14 NOTE — PROGRESS NOTES
V2.0  [facility] Hospitalist Progress Note      Name:  Angela Branham /Age/Sex: 1945  (65 y.o. female)   MRN & CSN:  [id number] & [id number] Encounter Date/Time: 2022 11:31 AM EST    Location:  -A PCP: Tim Gillis APRN - [id number] [facility] Day: 3    Assessment and Plan:   Angela Branham is a 65 y.o. female with pmh of type 2 diabetes mellitus, hypertension who presents with Stroke-like symptoms    Interval events:  -Briefly, patient is 65years old female with a past medical history of hypertension, non-insulin-dependent diabetes mellitus type 2, who presented with strokelike symptoms to the hospital on . Patient presented to the ED for left-sided extremity weakness, dysarthria, left facial numbness with an onset 4 days back, possibly redness day around afternoon. Patient reports to be in usual state of health after taking has been to his doctor's appointment, patient started to experience had left face started to sting. Patient subsequently noticed dysarthria, difficulty with gait and left facial droop. Patient also noticed speech changes and generalized malaise on admission as per record. [facility] was consulted, however patient not deemed a candidate for acute intervention as symptoms started 4 days back. CTA head and neck shows multiple areas of mild to moderate stenosis. Stroke risk factors including HTN, type 2 diabetes mellitus. On admission, reportedly per patient's family, patient'  has history of dementia and aggressive behavior. Case management was consulted to assist with with family dynamics and discharge planning. On admission, patient reported to be tearful at times. Vital signs with BP 1 7 7/87 mmHg, rest vital signs stable off oxygen on admission. Labs including CBC/CMP/troponin grossly unremarkable. CT head/CTA head neck with\"1. Decreased attenuation involving the right basal ganglia which may represent an age indeterminate infarct.  2. Otherwise, no acute intracranial abnormality. 3. Mild global parenchymal volume loss with chronic microvascular ischemic changes. 4. No flow limiting stenosis seen of the cervical carotid/vertebral arteries. 5. Atherosclerosis contributes to stenosis involving the supraclinoid ICAs bilaterally, mild on the right and moderate on the left. 6. Mild-to-moderate stenosis involving the A2/A3 segments of the anterior  cerebral arteries bilaterally. 7. Moderate stenosis involving the P1 segment of the right posterior cerebral artery with moderate focal stenosis involving the right P2/P3 segment. 8. Mild stenosis involving the P1 segment of the left posterior cerebral  Artery\". MRI brain 11/12 with\"Acute lacune within the keith. \".  -11/13-Rapid response/Stroke code was called for the patient around 7:50 AM today. Reportedly telemetry was showing sinus bradycardia to 30s/minute, patient was found to be unresponsive and hypotensive. Fingerstick glucose was 170s per staff. Patient slowly came to it and reported noticing some prodromal symptoms in terms of nausea/warmth and then passing out. Patient reports previous similar episodes of unclear etiology. Patient was recently given ropinirole tablet as continuation of her home medication for restless leg syndrome. Citing possibility of relation with the medications adverse effects, ropinirole was held. Similar episode occurred 1 hour later when patient was found to be bradycardic by telemetry, again becoming unresponsive, unclear whether a pulse was present so initially chest compressions were started and patient started to become responsive. Patient's blood pressure during both these episodes was hypotensive. Patient reported similar prodromal symptoms of nausea/vomiting and reports previous similar episodes patient also reports she has a family history of syncope in the setting of mitral valve prolapse and when she gets \"sick\", she tends to pass out. Set of basic labs were drawn. Patient improved symptomatically and case was discussed with cardiologist who assessed patient at bedside. Critical care team was also present at bedside during the episode of decompensation. Patient transferred to the ICU for further monitoring, chest pads in place. Given that patient blood pressure improved without intervention cardiologist considering possibility of vasovagal episode. Currently we will hold off on any dopamine drip/atropine given heart rate improves/blood pressure improved without intervention. But if patient gets worse/having persistent or recurrent symptomatic bradycardia, may possibly need dopamine infusion/temporary pacemaker. VBG-7.47/39/28, lactate 1.6, prolactin 46, CBC/basic unremarkable, lipid panel from a.m. with cholesterol 250/, A1c 6.7%, troponin/TSH/Lyme disease antibodies pending. Echo is currently pending. -11/14-patient seen and examined at bedside, VSS /82mmHg, currently off oxygen-PT- Home w OP PT/OT-Home care ordered. Patient having some tremors overnight, relating them to possibly atorvastatin which is on hold and will discuss with Neurology. Cardiology planning on Outpatient Holter upon discharge. EKG in the a.m. with no acute changes, tropes unremarkable, rest of labs with no changes. Will downgrade patient from ICU today. Transthoracic echo with EF 55-60%, mild LVH, no WMA, no valvular abnormalities, mild MR and no evidence of pericardial effusion.     Plan:    Episode of unresponsiveness/syncope:  -Possibilities, vasovagal syncope versus possible valvular heart disease/MVP given family history?  -Monitor in ICU   -Telemetry  -TTE pending  -Cardiology consulted, will appreciate recs  -TSH/Lyme disease antibodies  -No indication for temporary pacemaker per cardiology at this point  -Monitor patient  -Avoid beta-blockers  -Outpatient 30-day monitor per cardiology  -Patient recently started on amlodipine 5 mg/losartan 25 mg  Left-sided facial numbness and left sided extremity weakness:  Acute/subacute stroke:  Abnormal CT head  -Last well-known 11/8/2022, NIHSS on admission to floor-2  -NIHSS 2 as per bedside examination 11/13  -CT head/CTA as above  -MRI as above  -Neurology consult  -DAPT  -Patient has sensitivity to statin  -HbA1c as above  -Lipid panel as above  -Transthoracic echocardiogram  -PT/OT  -Neurochecks every 6 hours next 24 hours  -Case management consult for discharge planning    Diet ADULT DIET; Regular; 4 carb choices (60 gm/meal)   DVT Prophylaxis [] Lovenox, []  Heparin, [] SCDs, [] Ambulation,  [] Eliquis, [] Xarelto  [] Coumadin   Code Status Full Code   Disposition From: Home  Expected Disposition: TBD/pending  follow-up  Estimated Date of Discharge: 1-2 days  Patient requires continued admission due to Cardiology clearance before discharge   Surrogate Decision Maker/ POA Self     Subjective:     Chief Complaint: Extremity Weakness (Left sided, started wednesday)       Amanda Martínez is a 65 y.o. female who presents with possible subacute stroke. Patient denies any complaint on subsequent examination after the rapid response. Lying comfortably in bed on O2 via nasal cannula for comfort. Review of Systems:    Review of Systems    Review of Systems:   Constitutional: Negative. Negative for activity change, appetite change, chills, diaphoresis, fatigue, fever and unexpected weight change. HENT: Negative. Negative for congestion, dental problem, drooling, ear discharge, ear pain, facial swelling, hearing loss, mouth sores, nosebleeds, postnasal drip, rhinorrhea, sinus pressure, sinus pain, sneezing, sore throat, tinnitus, trouble swallowing and voice change. Eyes: Negative. Negative for photophobia, pain, discharge, redness, itching and visual disturbance. Respiratory: Negative. Negative for apnea, cough, choking, chest tightness, shortness of breath, wheezing and stridor. Cardiovascular: Negative.   Negative for chest pain and palpitations. Gastrointestinal: Negative. Negative for abdominal distention, abdominal pain, anal bleeding, blood in stool, constipation, diarrhea, nausea, rectal pain and vomiting. Endocrine: Negative. Negative for cold intolerance, heat intolerance, polydipsia, polyphagia and polyuria. Genitourinary: Negative. Negative for decreased urine volume, difficulty urinating, dysuria, enuresis, flank pain, frequency, genital sores, hematuria, penile discharge, penile pain, penile swelling, scrotal swelling, testicular pain and urgency. Musculoskeletal: Negative. Negative for arthralgias, back pain, gait problem, joint swelling, myalgias, neck pain and neck stiffness. Skin: Negative. Negative for color change, pallor, rash and wound. Allergic/Immunologic: Negative for environmental allergies, food allergies and immunocompromised state. Neurological: Negative. Negative for dizziness, tremors, seizures, syncope, facial asymmetry, speech difficulty, weakness, light-headedness, numbness and headaches. Hematological: Negative. Negative for adenopathy. Does not bruise/bleed easily. Psychiatric/Behavioral: Negative. Negative for agitation, behavioral problems, confusion, decreased concentration, dysphoric mood, hallucinations, self-injury, sleep disturbance and suicidal ideas. The patient is not nervous/anxious and is not hyperactive. Objective:   No intake or output data in the 24 hours ending 11/14/22 1128       Vitals:   Vitals:    11/14/22 0905   BP: (!) 172/84   Pulse: (!) 102   Resp: 24   Temp:    SpO2:        Physical Exam:     General: NAD  Eyes: EOMI  ENT: neck supple  Cardiovascular: Regular rate. Respiratory: Clear to auscultation  Gastrointestinal: Soft, non tender  Genitourinary: no suprapubic tenderness  Musculoskeletal: No edema  Skin: warm, dry  Neuro: Alert. Psych: Mood appropriate.      Medications:   Medications:    enoxaparin  40 mg SubCUTAneous Daily clopidogrel  75 mg Oral Daily    aspirin  81 mg Oral Daily    losartan  25 mg Oral Daily    therapeutic multivitamin-minerals  1 tablet Oral Daily    FLUoxetine  10 mg Oral QAM    insulin lispro  0-4 Units SubCUTAneous TID WC    insulin lispro  0-4 Units SubCUTAneous Nightly    atorvastatin  40 mg Oral Nightly    amLODIPine  5 mg Oral Daily    [Held by provider] rOPINIRole  0.5 mg Oral Nightly      Infusions:    dextrose       PRN Meds: LORazepam, 1 mg, Q4H PRN  labetalol, 10 mg, Q4H PRN  hydrALAZINE, 10 mg, Q6H PRN  ondansetron, 4 mg, Q8H PRN   Or  ondansetron, 4 mg, Q6H PRN  polyethylene glycol, 17 g, Daily PRN  glucose, 4 tablet, PRN  dextrose bolus, 125 mL, PRN   Or  dextrose bolus, 250 mL, PRN  glucagon (rDNA), 1 mg, PRN  dextrose, , Continuous PRN      Labs      Recent Results (from the past 24 hour(s))   POCT Glucose    Collection Time: 11/13/22 12:10 PM   Result Value Ref Range    POC Glucose 231 (H) 70 - 99 MG/DL   POCT Glucose    Collection Time: 11/13/22  4:51 PM   Result Value Ref Range    POC Glucose 124 (H) 70 - 99 MG/DL   Troponin    Collection Time: 11/13/22  5:47 PM   Result Value Ref Range    Troponin T <0.010 <0.01 NG/ML   POCT Glucose    Collection Time: 11/13/22  8:21 PM   Result Value Ref Range    POC Glucose 154 (H) 70 - 99 MG/DL   EKG 12 Lead    Collection Time: 11/13/22 11:41 PM   Result Value Ref Range    Ventricular Rate 88 BPM    Atrial Rate 88 BPM    P-R Interval 174 ms    QRS Duration 70 ms    Q-T Interval 380 ms    QTc Calculation (Bazett) 459 ms    P Axis 74 degrees    R Axis 15 degrees    T Axis 49 degrees    Diagnosis       Normal sinus rhythm  Nonspecific ST and T wave abnormality  Abnormal ECG  When compared with ECG of 13-NOV-2022 07:01,  aberrant conduction is no longer present  Vent.  rate has increased BY  31 BPM  ST now depressed in Inferior leads  Non-specific change in ST segment in Anterolateral leads  Nonspecific T wave abnormality now evident in Anterolateral leads Troponin    Collection Time: 11/14/22  3:32 AM   Result Value Ref Range    Troponin T <0.010 <0.01 NG/ML   CBC with Auto Differential    Collection Time: 11/14/22  6:50 AM   Result Value Ref Range    WBC 10.4 4.0 - 10.5 K/CU MM    RBC 4.24 4.2 - 5.4 M/CU MM    Hemoglobin 13.2 12.5 - 16.0 GM/DL    Hematocrit 38.6 37 - 47 %    MCV 91.0 78 - 100 FL    MCH 31.1 (H) 27 - 31 PG    MCHC 34.2 32.0 - 36.0 %    RDW 12.4 11.7 - 14.9 %    Platelets 997 678 - 690 K/CU MM    MPV 9.7 7.5 - 11.1 FL    Differential Type AUTOMATED DIFFERENTIAL     Segs Relative 84.0 (H) 36 - 66 %    Lymphocytes % 9.4 (L) 24 - 44 %    Monocytes % 5.9 (H) 0 - 4 %    Eosinophils % 0.1 0 - 3 %    Basophils % 0.3 0 - 1 %    Segs Absolute 8.7 K/CU MM    Lymphocytes Absolute 1.0 K/CU MM    Monocytes Absolute 0.6 K/CU MM    Eosinophils Absolute 0.0 K/CU MM    Basophils Absolute 0.0 K/CU MM    Nucleated RBC % 0.0 %    Total Nucleated RBC 0.0 K/CU MM    Total Immature Neutrophil 0.03 K/CU MM    Immature Neutrophil % 0.3 0 - 0.43 %   Basic Metabolic Panel    Collection Time: 11/14/22  6:50 AM   Result Value Ref Range    Sodium 141 135 - 145 MMOL/L    Potassium 3.9 3.5 - 5.1 MMOL/L    Chloride 104 99 - 110 mMol/L    CO2 24 21 - 32 MMOL/L    Anion Gap 13 4 - 16    BUN 13 6 - 23 MG/DL    Creatinine 0.7 0.6 - 1.1 MG/DL    Est, Glom Filt Rate >60 >60 mL/min/1.73m2    Glucose 176 (H) 70 - 99 MG/DL    Calcium 9.1 8.3 - 10.6 MG/DL   Magnesium    Collection Time: 11/14/22  6:50 AM   Result Value Ref Range    Magnesium 1.9 1.8 - 2.4 mg/dl   EKG 12 Lead    Collection Time: 11/14/22  7:54 AM   Result Value Ref Range    Ventricular Rate 76 BPM    Atrial Rate 76 BPM    P-R Interval 144 ms    QRS Duration 88 ms    Q-T Interval 410 ms    QTc Calculation (Bazett) 461 ms    P Axis 0 degrees    R Axis 10 degrees    T Axis 34 degrees    Diagnosis       Normal sinus rhythm  Normal ECG  When compared with ECG of 13-NOV-2022 23:41,  ST no longer depressed in Inferior leads  Nonspecific T wave abnormality no longer evident in Anterolateral leads     POCT Glucose    Collection Time: 11/14/22  9:32 AM   Result Value Ref Range    POC Glucose 195 (H) 70 - 99 MG/DL        Imaging/Diagnostics Last 24 Hours   CT Head W/O Contrast    Result Date: 11/12/2022  EXAMINATION: CTA OF THE HEAD AND NECK WITH CONTRAST; CT OF THE HEAD WITHOUT CONTRAST 11/12/2022 11:15 am; 11/12/2022 11:16 am: TECHNIQUE: CTA of the head and neck was performed with the administration of intravenous contrast. Multiplanar reformatted images are provided for review. MIP images are provided for review. Stenosis of the internal carotid arteries measured using NASCET criteria. Automated exposure control, iterative reconstruction, and/or weight based adjustment of the mA/kV was utilized to reduce the radiation dose to as low as reasonably achievable.; CT of the head was performed without the administration of intravenous contrast. Automated exposure control, iterative reconstruction, and/or weight based adjustment of the mA/kV was utilized to reduce the radiation dose to as low as reasonably achievable. Noncontrast CT of the head with reconstructed 2-D images are also provided for review. COMPARISON: None. HISTORY: ORDERING SYSTEM PROVIDED HISTORY: Left Side weakness x 4 days, disarthria TECHNOLOGIST PROVIDED HISTORY: Reason for exam:->Left Side weakness x 4 days, disarthria Has a \"code stroke\" or \"stroke alert\" been called? ->No Decision Support Exception - unselect if not a suspected or confirmed emergency medical condition->Emergency Medical Condition (MA) Reason for Exam: Left Side weakness x 4 days, disarthria Additional signs and symptoms: 75ml Isovue 370; ORDERING SYSTEM PROVIDED HISTORY: Stroke sx x 4 days TECHNOLOGIST PROVIDED HISTORY: Reason for exam:->Stroke sx x 4 days Has a \"code stroke\" or \"stroke alert\" been called? ->No Decision Support Exception - unselect if not a suspected or confirmed emergency medical condition->Emergency Medical Condition (MA) Reason for Exam: Stroke sx x 4 days Initial evaluation. FINDINGS: CT HEAD: BRAIN/VENTRICLES: There is decreased attenuation involving the right basal ganglia. No acute intracranial hemorrhage or extraaxial fluid collection. Grey-white differentiation is maintained. No evidence of mass, mass effect or midline shift. No evidence of hydrocephalus. There are areas of hypoattenuation in the periventricular and subcortical white matter, which is nonspecific, but may represent chronic microvasvular ischemic change. There appears to be a small chronic infarct in the right cerebellum. Minimal global parenchymal volume loss. Atherosclerosis of the intracranial vasculature is noted. ORBITS: The visualized portion of the orbits demonstrate no acute abnormality. SINUSES:  The visualized paranasal sinuses and mastoid air cells demonstrate no acute abnormality. SOFT TISSUES/SKULL: No acute abnormality of the visualized skull or soft tissues. CTA NECK: AORTIC ARCH/ARCH VESSELS: Scattered atherosclerosis seen of the aortic arch and arch vessels. Incidentally noted is a common origin of the innominate and right common carotid arteries, which is a normal variant. No significant stenosis seen of the innominate or subclavian arteries. CAROTID ARTERIES: No focal stenosis seen of the common carotid arteries. Minimal atherosclerosis involving the proximal cervical ICAs bilaterally. No flow limiting stenosis of the internal carotid arteries by NASCET criteria. No evidence of a dissection. VERTEBRAL ARTERIES: No dissection or flow limiting stenosis seen of the vertebral arteries. SOFT TISSUES: No focal consolidation within the visualized lung apices. No acute abnormality within the visualized superior mediastinum. BONES: No acute osseous abnormality. CTA HEAD: ANTERIOR CIRCULATION: Atherosclerosis seen of the internal carotid arteries bilaterally.   This contributes to mild stenosis of the right supraclinoid ICA with moderate stenosis of the left supraclinoid ICA. There is a 1-2 mm outpouching arising from the left cervical ICA (sagittal series 605, image 114). There appears to be mild-to-moderate stenosis involving the A2/A3 segments bilaterally (sagittal MIP series 608, image 51). No significant stenosis seen of the middle cerebral arteries. POSTERIOR CIRCULATION: Moderate stenosis seen of the right P1 segment (coronal MIP series 607, image 49). There is mild stenosis of the left P1 segment (axial MIP series 606, image 41). There is moderate focal stenosis involving the right P2/P3 segment (axial MIP series 606, image 44). No significant stenosis seen of the basilar or vertebral arteries. No aneurysm identified. 1. Decreased attenuation involving the right basal ganglia which may represent an age indeterminate infarct. 2. Otherwise, no acute intracranial abnormality. 3. Mild global parenchymal volume loss with chronic microvascular ischemic changes. 4. No flow limiting stenosis seen of the cervical carotid/vertebral arteries. 5. Atherosclerosis contributes to stenosis involving the supraclinoid ICAs bilaterally, mild on the right and moderate on the left. 6. Mild-to-moderate stenosis involving the A2/A3 segments of the anterior cerebral arteries bilaterally. 7. Moderate stenosis involving the P1 segment of the right posterior cerebral artery with moderate focal stenosis involving the right P2/P3 segment. 8. Mild stenosis involving the P1 segment of the left posterior cerebral artery. XR CHEST PORTABLE    Result Date: 11/12/2022  EXAMINATION: ONE XRAY VIEW OF THE CHEST 11/12/2022 11:38 am COMPARISON: 07/24/2022 HISTORY: ORDERING SYSTEM PROVIDED HISTORY: Stroke TECHNOLOGIST PROVIDED HISTORY: Reason for exam:->Stroke Reason for Exam: stroke FINDINGS: The lungs are without acute focal process. There is no effusion or pneumothorax. The cardiomediastinal silhouette is stable.  The osseous structures are stable. No acute process. CTA HEAD NECK W CONTRAST    Result Date: 11/12/2022  EXAMINATION: CTA OF THE HEAD AND NECK WITH CONTRAST; CT OF THE HEAD WITHOUT CONTRAST 11/12/2022 11:15 am; 11/12/2022 11:16 am: TECHNIQUE: CTA of the head and neck was performed with the administration of intravenous contrast. Multiplanar reformatted images are provided for review. MIP images are provided for review. Stenosis of the internal carotid arteries measured using NASCET criteria. Automated exposure control, iterative reconstruction, and/or weight based adjustment of the mA/kV was utilized to reduce the radiation dose to as low as reasonably achievable.; CT of the head was performed without the administration of intravenous contrast. Automated exposure control, iterative reconstruction, and/or weight based adjustment of the mA/kV was utilized to reduce the radiation dose to as low as reasonably achievable. Noncontrast CT of the head with reconstructed 2-D images are also provided for review. COMPARISON: None. HISTORY: ORDERING SYSTEM PROVIDED HISTORY: Left Side weakness x 4 days, disarthria TECHNOLOGIST PROVIDED HISTORY: Reason for exam:->Left Side weakness x 4 days, disarthria Has a \"code stroke\" or \"stroke alert\" been called? ->No Decision Support Exception - unselect if not a suspected or confirmed emergency medical condition->Emergency Medical Condition (MA) Reason for Exam: Left Side weakness x 4 days, disarthria Additional signs and symptoms: 75ml Isovue 370; ORDERING SYSTEM PROVIDED HISTORY: Stroke sx x 4 days TECHNOLOGIST PROVIDED HISTORY: Reason for exam:->Stroke sx x 4 days Has a \"code stroke\" or \"stroke alert\" been called? ->No Decision Support Exception - unselect if not a suspected or confirmed emergency medical condition->Emergency Medical Condition (MA) Reason for Exam: Stroke sx x 4 days Initial evaluation.  FINDINGS: CT HEAD: BRAIN/VENTRICLES: There is decreased attenuation involving the right basal ganglia. No acute intracranial hemorrhage or extraaxial fluid collection. Grey-white differentiation is maintained. No evidence of mass, mass effect or midline shift. No evidence of hydrocephalus. There are areas of hypoattenuation in the periventricular and subcortical white matter, which is nonspecific, but may represent chronic microvasvular ischemic change. There appears to be a small chronic infarct in the right cerebellum. Minimal global parenchymal volume loss. Atherosclerosis of the intracranial vasculature is noted. ORBITS: The visualized portion of the orbits demonstrate no acute abnormality. SINUSES:  The visualized paranasal sinuses and mastoid air cells demonstrate no acute abnormality. SOFT TISSUES/SKULL: No acute abnormality of the visualized skull or soft tissues. CTA NECK: AORTIC ARCH/ARCH VESSELS: Scattered atherosclerosis seen of the aortic arch and arch vessels. Incidentally noted is a common origin of the innominate and right common carotid arteries, which is a normal variant. No significant stenosis seen of the innominate or subclavian arteries. CAROTID ARTERIES: No focal stenosis seen of the common carotid arteries. Minimal atherosclerosis involving the proximal cervical ICAs bilaterally. No flow limiting stenosis of the internal carotid arteries by NASCET criteria. No evidence of a dissection. VERTEBRAL ARTERIES: No dissection or flow limiting stenosis seen of the vertebral arteries. SOFT TISSUES: No focal consolidation within the visualized lung apices. No acute abnormality within the visualized superior mediastinum. BONES: No acute osseous abnormality. CTA HEAD: ANTERIOR CIRCULATION: Atherosclerosis seen of the internal carotid arteries bilaterally. This contributes to mild stenosis of the right supraclinoid ICA with moderate stenosis of the left supraclinoid ICA. There is a 1-2 mm outpouching arising from the left cervical ICA (sagittal series 605, image 114). There appears to be mild-to-moderate stenosis involving the A2/A3 segments bilaterally (sagittal MIP series 608, image 51). No significant stenosis seen of the middle cerebral arteries. POSTERIOR CIRCULATION: Moderate stenosis seen of the right P1 segment (coronal MIP series 607, image 49). There is mild stenosis of the left P1 segment (axial MIP series 606, image 41). There is moderate focal stenosis involving the right P2/P3 segment (axial MIP series 606, image 44). No significant stenosis seen of the basilar or vertebral arteries. No aneurysm identified. 1. Decreased attenuation involving the right basal ganglia which may represent an age indeterminate infarct. 2. Otherwise, no acute intracranial abnormality. 3. Mild global parenchymal volume loss with chronic microvascular ischemic changes. 4. No flow limiting stenosis seen of the cervical carotid/vertebral arteries. 5. Atherosclerosis contributes to stenosis involving the supraclinoid ICAs bilaterally, mild on the right and moderate on the left. 6. Mild-to-moderate stenosis involving the A2/A3 segments of the anterior cerebral arteries bilaterally. 7. Moderate stenosis involving the P1 segment of the right posterior cerebral artery with moderate focal stenosis involving the right P2/P3 segment. 8. Mild stenosis involving the P1 segment of the left posterior cerebral artery. MRI brain without contrast    Result Date: 11/12/2022  EXAMINATION: MRI OF THE BRAIN WITHOUT CONTRAST  11/12/2022 5:59 pm TECHNIQUE: Multiplanar multisequence MRI of the brain was performed without the administration of intravenous contrast. COMPARISON: None.  HISTORY: ORDERING SYSTEM PROVIDED HISTORY: decreased attenuation right basal ganglia TECHNOLOGIST PROVIDED HISTORY: Reason for exam:->decreased attenuation right basal ganglia Decision Support Exception - unselect if not a suspected or confirmed emergency medical condition->Emergency Medical Condition (MA) Reason for Exam: decreased attenuation rt basal ganglia FINDINGS: INTRACRANIAL STRUCTURES/VENTRICLES: There is an acute lacunar infarct within the right hemipons. A chronic infarct is present within the right cerebellar hemisphere. There is a chronic lacune in the right mid corona radiata. No mass effect or midline shift. No evidence of an acute intracranial hemorrhage. There is volume loss with mild chronic white matter microvascular ischemic change. The sellar/suprasellar regions appear unremarkable. The normal signal voids within the major intracranial vessels appear maintained. ORBITS: The visualized portion of the orbits demonstrate no acute abnormality. SINUSES: The visualized paranasal sinuses and mastoid air cells demonstrate no acute abnormality. BONES/SOFT TISSUES: The bone marrow signal intensity appears normal. The soft tissues demonstrate no acute abnormality. Acute lacune within the keith. The findings were sent to the Radiology Results [address] at 6:07 pm on 11/12/2022 to be communicated to a licensed caregiver.      30 min of critical care time was spent on this patient    Electronically signed by Allison Louis MD on 11/14/2022 at 11:28 AM

## 2022-11-14 NOTE — PROGRESS NOTES
Cardiology Progress Note     Admit Date:  11/12/2022    Consult reason/ Seen today for :       Subjective and  Overnight Events : Patient and family reports that blood pressure has been elevated for quite some time she does not take medication regularly due to (perceived side effects)  No bradycardia events overnight      Chief complain on admission : 65 y. o.year old who is admitted for  Chief Complaint   Patient presents with    Extremity Weakness     Left sided, started wednesday      Assessment / Plan:  Bradycardia syncope like event could be a vagal event? Due to the stroke? TSH is normal we will plan outpatient 30-day monitor to screen for bradycardia as well as A. fib due to acute stroke  Transfer out of ICU  Avoid rate lowering medication  Lateral carotid abnormal intracranial coronary vessels have moderate stenosis  Acute CVA ,  Plan outpatient 30-day monitor  Echo shows preserved EF with no wall motion abnormality normal valves  Uncontrolled hypertension to have erratic blood pressure getting from 170s to 80s continue to monitor for now started on amlodipine 5 mg daily and increase losartan 50 mg daily continue to monitor  6. Dyslipidemia: continue statins   DVT prophylaxis  Past medical history:    has a past medical history of Anxiety, Diabetes mellitus ([address] and Hypertension. Past surgical history:   has a past surgical history that includes [place] tooth extraction and Cholecystectomy. Social History:   reports that she has never smoked. She has never used smokeless tobacco. She reports that she does not drink alcohol and does not use drugs. Family history:  family history includes Cancer in her father, mother, and sister. Allergies   Allergen Reactions    Statins Other (See Comments)     Nausea, unsteady feeling, restless legs.         Review of Systems:    All 14 systems were reviewed and are negative  Except for the positive findings  which as documented     BP (!) 155/59   Pulse 70   Temp 97.7 °F (36.5 °C) (Oral)   Resp 26   Ht 5' 2\" (1.575 m)   Wt 148 lb 2.4 oz (67.2 kg)   SpO2 96%   BMI 27.10 kg/m²     Intake/Output Summary (Last 24 hours) at 11/14/2022 1859  Last data filed at 11/14/2022 1100  Gross per 24 hour   Intake 300 ml   Output --   Net 300 ml     Physical Exam:  Constitutional:  Well developed, Well nourished, No acute distress, Non-toxic appearance. HENT:  Normocephalic, Atraumatic, Bilateral external ears normal, Oropharynx moist, No oral exudates, Nose normal. Neck- Normal range of motion, No tenderness, Supple, No stridor. Eyes:  PERRL, EOMI, Conjunctiva normal, No discharge. Respiratory:  Normal breath sounds, No respiratory distress, No wheezing, No chest tenderness. Cardiovascular:  Normal heart rate, Normal rhythm, No murmurs, No rubs, No gallops, JVP not elevated  Abdomen/GI:  Bowel sounds normal, Soft, No tenderness, No masses, No pulsatile masses. Musculoskeletal:  Intact distal pulses, No edema, No tenderness, No cyanosis, No clubbing. Good range of motion in all major joints. No tenderness to palpation or major deformities noted. Back- No tenderness. Integument:  Warm, Dry, No erythema, No rash. Lymphatic:  No lymphadenopathy noted. Neurologic:  Alert & oriented x 3, Normal motor function, Normal sensory function, No focal deficits noted.    Psychiatric:  Affect  and  Mood :no change    Medications:    enoxaparin  40 mg SubCUTAneous Daily    clopidogrel  75 mg Oral Daily    aspirin  81 mg Oral Daily    losartan  25 mg Oral Daily    therapeutic multivitamin-minerals  1 tablet Oral Daily    FLUoxetine  10 mg Oral QAM    insulin lispro  0-4 Units SubCUTAneous TID WC    insulin lispro  0-4 Units SubCUTAneous Nightly    atorvastatin  40 mg Oral Nightly    amLODIPine  5 mg Oral Daily    [Held by provider] rOPINIRole  0.5 mg Oral Nightly      dextrose       LORazepam, labetalol, hydrALAZINE, ondansetron **OR** ondansetron, polyethylene glycol, glucose, dextrose bolus **OR** dextrose bolus, glucagon (rDNA), dextrose    Lab Data:  CBC:   Recent Labs     11/12/22  1030 11/13/22  0700 11/14/22  0650   WBC 8.1 7.4 10.4   HGB 14.3 13.4 13.2   HCT 41.4 38.5 38.6   MCV 90.6 90.8 91.0    320 340     BMP:   Recent Labs     11/12/22  1030 11/13/22  0700 11/14/22  0650    138 141   K 3.8 3.9 3.9   CL 98* 101 104   CO2 24 25 24   BUN 15 14 13   CREATININE 0.7 0.7 0.7     PT/INR:   Recent Labs     11/12/22  1030   PROTIME 11.3*   INR 0.88     BNP:  No results for input(s): PROBNP in the last 72 hours. TROPONIN:   Recent Labs     11/14/22  0332 11/14/22  1256 11/14/22  1526   TROPONINT <0.010 0.012* 0.012*        ECHO :   echocardiogram    Assessment:  65 y. o.year old who is admitted for  Chief Complaint   Patient presents with    Extremity Weakness     Left sided, started wednesday    , active issues as noted below:  Impression:  Principal Problem:    Stroke-like symptoms  Active Problems:    Bradycardia    Right pontine stroke (HCC)    Mixed hyperlipidemia    Left hemiparesis (HCC)    TIA (transient ischemic attack)    Type 2 diabetes mellitus without complication, without long-term current use of insulin ([facility]  Resolved Problems:    * No resolved hospital problems. *            All labs, medications and tests reviewed by myself , continue all other medications of all above medical condition listed as is except for changes mentioned above. Thank you very much for consult , please call with questions.     Alisia Mena MD, MD 11/14/2022 6:59 PM

## 2022-11-14 NOTE — PROGRESS NOTES
Physical Therapy  [facility] ACUTE CARE PHYSICAL THERAPY EVALUATION  Kacey Kim, 1945, 2129/2129-A, 11/14/2022    History  [place]:  The encounter diagnosis was Cerebrovascular accident (CVA), unspecified mechanism ([address] Patient  has a past medical history of Anxiety, Diabetes mellitus ([address] and Hypertension. Patient  has a past surgical history that includes [place] tooth extraction and Cholecystectomy. Subjective:  Patient states:  \"I need to be up moving\"   Pain:  denies   Communication with other providers: CM,  Handoff to RN, co-eval with Sherrie MOTTA   Restrictions: general precautions, falls     Home Setup/Prior level of function  Social/Functional History  Lives With: Spouse  Type of Home: House  Home Layout: Laundry in basement, Two level  Home Access: Stairs to enter without rails  Entrance Stairs - Number of Steps: 3  Entrance Stairs - Rails: None  Bathroom Shower/Tub: Walk-in shower  Bathroom Toilet: Standard  Bathroom Equipment:  (no bathroom DME)  Home Equipment:  (no AD)  ADL Assistance: Independent  Homemaking Assistance: Independent  Ambulation Assistance: Independent (typically doesn't use AD)  Transfer Assistance: Independent  Active : Yes  Occupation: Caregiver  Additional Comments: 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 Typically very active and independent. Examination of body systems (includes body structures/functions, activity/participation limitations):  Observation:  Supine in bed upon arrival. Cooperative with therapy. Daughter visiting and supportive. Vision:  hx of double vision since July 2022. Pt states when she turns her head to the left, her right eye becomes \"blurry with double vision\"   Hearing:  slightly [place]   Cardiopulmonary: BP supine 151/72, sitting 172/84 with small complaint of dizziness with change in position.    Orientation: oriented x 4     Musculoskeletal  ROM R/L:  [facility] BLEs  Strength R/L:  BLES grossly 4+/5 with the exception of left DF 4/5. Neuro: intact coordination and sensation to BLEs    Mobility/treatment:   Rolling L/R:  NT   Supine to sit:  SBA for safety with HOB slightly elevated  Transfers:   Sit to stand: CGA for safety from EOB  Stand to sit: CGA for safety to recliner   Step pivot: CGA for safety without AD   Sitting balance:  SBA for safety at EOB, static and light dynamic without UE support   Standing balance:  CGA for safety, static without UE support   Gait: ~400ft without AD CGA for safety. Dec cliff, timid/cautious, dec bilat step length. Pt reports to a hx of vertigo and has difficulty at baseline with head turns. Was able to turn head in both directions without LOB but was unable to tolerate looking up due to vertigo. No major LOB noted  Educated pt on POC, role of PT, DME, home set up, strokes, discharge. [facility] 6 Clicks Inpatient Mobility:  AM-PAC Inpatient Mobility Raw Score : 18    Safety: patient left in chair, call light within reach, RN notified, gait belt used. Assessment:  Pt is a 65year old female admitted with left sided weakness and facial numbness. MRI of the brain showed an acute infarct within the right keith and a chronic infarct within the right cerebellum hemisphere. Recommend home with inc support from family for higher level IADLs/transport and outpatient PT. At baseline she is very indep with mobility and ADLs/IADLs. She performed well this date though below her baseline with dec balance, strength, and activity tolerance. She would benefit from continued therapy to address her current deficits, dec potential fall risk, dec burden of care, and restore PLOF. Complexity: Moderate  Prognosis: Good, no significant barriers to participation at this time. Plan: 3-5 times per week  Discharge Recommendations: Home with assist PRN, Outpatient PT  Equipment: Pt adamant she will not use a cane or walker.  She would benefit from grab bars/shower chair if agreeable.      Goals:  Short Term Goals  Time Frame for Short Term Goals: 2 weeks  Short Term Goal 1: Pt will perform bed mobility Darrian  Short Term Goal 2: Pt will transfer to all surfaces Darrian  Short Term Goal 3: Pt will ambulate 200ft with LRAD Darrian  Short Term Goal 4: Pt will ascend/descend 3 steps, single rail SBA       Treatment plan:  Bed mobility, transfers, balance, gait, TA, TX, stairs     Recommendations for NURSING mobility: ambulate frequently with gait belt a time permits     Time:   Time in: 0849  Time out: 0921  Timed treatment minutes: 15  Total time: 32 minutes    Electronically signed by:    Milo Orourke [id number]  11/14/2022, 10:03 AM

## 2022-11-14 NOTE — PROGRESS NOTES
Speech Language Pathology  Facility/Department: [facility]   CLINICAL BEDSIDE SWALLOW EVALUATION    NAME: Lilian Matt  : 1945  MRN: [id number]  ADMISSION DATE: 2022    Impressions:  Lilian Matt was seen for a bedside swallow evaluation after being admitted to [facility] with stroke like symptoms. Medical hx includes anxiety, diabetes mellitus, HTN. Per radiologist report CT reveals \"decreased attenuation involving the right basal ganglia which may represent an age indeterminate infarct. \" MRI reveals \"acute lacune within the keith. \" No prior hx of dysphagia. Lilian Matt was seated upright in chair, alert, cooperative, and pleasant. Daughter present throughout. Oral [facility] exam appears to be [place] with intact labial coordination/ROM, lingual ROM/strength/coordination, and velar elevation. Slight left sided droop by pt's lip was identified by daughter and SLP. PO trials of thins via cup/straw, puree, and solids were given. Oral stage appears WFL characterized by intact labial closure, bolus mastication/reduction, AP transit, and adequate oral clearance. Pharyngeal stage appears WFL with no s/s of aspiration observed throughout PO trials. Speech, language, cognition screened, pt reports mild changes in speaking rate. Pt is 100% intelligible. Recommendations:  Continue regular solids and thin liquid diet. Results/recommendations d/w pt and daughter. No further SLP needs identified at this time. Pt could benefit from outpatient speech therapy to target motor speech if pt desires. ADMITTING DIAGNOSIS: has Type 2 diabetes mellitus without complication, without long-term current use of insulin ([address] Essential hypertension; Anxiety; Colicky RUQ abdominal pain; Chronic calculous cholecystitis; Stroke-like symptoms; Bradycardia; Right pontine stroke ([address]  Mixed hyperlipidemia; and Left hemiparesis ([address] on their problem list.  ONSET DATE: this admission     Recent Chest Xray/CT of Chest: Education Response: Verbalizes understanding  Safety Devices in place: Yes  Type of devices: Left in chair       Therapy Time  SLP Individual Minutes  Time In: 9946  Time Out: 0940  Minutes: [address]  11/14/2022 11:32 AM

## 2022-11-14 NOTE — PROGRESS NOTES
Dr Mayi Hood here. Talked to patient and daughter regarding plan for cardiac viewpoint. Will not need pacer at this time.   Will do follow-up  outpt with holter monitor

## 2022-11-15 VITALS
HEIGHT: 62 IN | BODY MASS INDEX: 27.26 KG/M2 | SYSTOLIC BLOOD PRESSURE: 156 MMHG | RESPIRATION RATE: 18 BRPM | DIASTOLIC BLOOD PRESSURE: 58 MMHG | HEART RATE: 58 BPM | WEIGHT: 148.15 LBS | OXYGEN SATURATION: 97 % | TEMPERATURE: 98.1 F

## 2022-11-15 LAB
BORRELIA BURGDORFERI AB IGG: NEGATIVE
GLUCOSE BLD-MCNC: 171 MG/DL (ref 70–99)
LYME AB IGM BY WB:: NEGATIVE
TROPONIN T: <0.01 NG/ML

## 2022-11-15 PROCEDURE — 82962 GLUCOSE BLOOD TEST: CPT

## 2022-11-15 PROCEDURE — 6370000000 HC RX 637 (ALT 250 FOR IP): Performed by: STUDENT IN AN ORGANIZED HEALTH CARE EDUCATION/TRAINING PROGRAM

## 2022-11-15 PROCEDURE — 84484 ASSAY OF TROPONIN QUANT: CPT

## 2022-11-15 PROCEDURE — 6370000000 HC RX 637 (ALT 250 FOR IP): Performed by: INTERNAL MEDICINE

## 2022-11-15 PROCEDURE — 36415 COLL VENOUS BLD VENIPUNCTURE: CPT

## 2022-11-15 PROCEDURE — 6360000002 HC RX W HCPCS

## 2022-11-15 PROCEDURE — 6370000000 HC RX 637 (ALT 250 FOR IP)

## 2022-11-15 RX ORDER — AMLODIPINE BESYLATE 5 MG/1
5 TABLET ORAL DAILY
Qty: 30 TABLET | Refills: 3 | Status: SHIPPED | OUTPATIENT
Start: 2022-11-16

## 2022-11-15 RX ORDER — CLOPIDOGREL BISULFATE 75 MG/1
75 TABLET ORAL DAILY
Qty: 17 TABLET | Refills: 0 | Status: SHIPPED | OUTPATIENT
Start: 2022-11-16 | End: 2022-12-03

## 2022-11-15 RX ORDER — CLOPIDOGREL BISULFATE 75 MG/1
75 TABLET ORAL DAILY
Qty: 30 TABLET | Refills: 3 | Status: SHIPPED | OUTPATIENT
Start: 2022-11-16 | End: 2022-11-15 | Stop reason: SDUPTHER

## 2022-11-15 RX ORDER — ATORVASTATIN CALCIUM 40 MG/1
40 TABLET, FILM COATED ORAL NIGHTLY
Qty: 30 TABLET | Refills: 3 | Status: SHIPPED | OUTPATIENT
Start: 2022-11-15

## 2022-11-15 RX ORDER — LOSARTAN POTASSIUM 50 MG/1
50 TABLET ORAL DAILY
Qty: 30 TABLET | Refills: 3 | Status: SHIPPED | OUTPATIENT
Start: 2022-11-16

## 2022-11-15 RX ORDER — METFORMIN HYDROCHLORIDE 500 MG/1
500 TABLET, EXTENDED RELEASE ORAL 2 TIMES DAILY
Qty: 30 TABLET | Refills: 3 | Status: SHIPPED | OUTPATIENT
Start: 2022-11-15

## 2022-11-15 RX ADMIN — CLOPIDOGREL BISULFATE 75 MG: 75 TABLET ORAL at 08:43

## 2022-11-15 RX ADMIN — AMLODIPINE BESYLATE 5 MG: 5 TABLET ORAL at 08:42

## 2022-11-15 RX ADMIN — FLUOXETINE 10 MG: 10 CAPSULE ORAL at 08:42

## 2022-11-15 RX ADMIN — LOSARTAN POTASSIUM 50 MG: 25 TABLET, FILM COATED ORAL at 08:42

## 2022-11-15 RX ADMIN — ASPIRIN 81 MG: 81 TABLET, COATED ORAL at 08:42

## 2022-11-15 RX ADMIN — ENOXAPARIN SODIUM 40 MG: 100 INJECTION SUBCUTANEOUS at 08:41

## 2022-11-15 RX ADMIN — Medication 1 TABLET: at 08:43

## 2022-11-15 NOTE — PROGRESS NOTES
Outpatient Pharmacy Progress Note for Meds-to-Beds    Total number of Prescriptions Filled: 5  The following medications were dispensed to the patient during the discharge process:  Amlodipine   Losartan  Atorvastatin  Metformin   Clopidogrel     Additional Documentation:  Patient's family member picked-up the medication(s) in the OP Pharmacy (daughter)         Thank you for letting us serve your patients.   [facility]    [address], [address] [facility]    Phone: [phone]    Fax: [phone]

## 2022-11-15 NOTE — PROGRESS NOTES
Neurology Service progress note  [address]   Patient Name: Florina Moritz  : 1945        Subjective:   CC: I was so weak I decided to come to the ER  Chart was reviewed in detail. Patient was seen and assessed. She is sitting up in chair at bedside. Daughter is with her in the room. Patient states that she is feeling much better today. She denies any weakness or numbness to her face or any continued speech changes. On exam speech is clear, language is fluent. She has no focal or lateralizing weakness or sensory change. Patient and daughter share difficult living arrangements at home with the patient's . 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 Past Medical History:   Diagnosis Date    Anxiety      has OCD. He had a stroke. Diabetes mellitus ([facility] [address] [address]     Hypertension     :   Past Surgical History:   Procedure Laterality Date    CHOLECYSTECTOMY      WISDOM TOOTH EXTRACTION      all over the years     Medications:  Scheduled Meds:   [START ON 11/15/2022] losartan  50 mg Oral Daily    enoxaparin  40 mg SubCUTAneous Daily    clopidogrel  75 mg Oral Daily    aspirin  81 mg Oral Daily    therapeutic multivitamin-minerals  1 tablet Oral Daily    FLUoxetine  10 mg Oral QAM    insulin lispro  0-4 Units SubCUTAneous TID WC    insulin lispro  0-4 Units SubCUTAneous Nightly    atorvastatin  40 mg Oral Nightly    amLODIPine  5 mg Oral Daily    [Held by provider] rOPINIRole  0.5 mg Oral Nightly     Continuous Infusions:   dextrose       PRN Meds:. LORazepam, labetalol, hydrALAZINE, ondansetron **OR** ondansetron, polyethylene glycol, glucose, dextrose bolus **OR** dextrose bolus, glucagon (rDNA), dextrose    Allergies   Allergen Reactions    Statins Other (See Comments)     Nausea, unsteady feeling, restless legs.       Social History Socioeconomic History    Marital status:      Spouse name: Not on file    Number of children: Not on file    Years of education: Not on file    Highest education level: Not on file   Occupational History    Not on file   Tobacco Use    Smoking status: Never    Smokeless tobacco: Never   Vaping Use    Vaping Use: Never used   Substance and Sexual Activity    Alcohol use: No    Drug use: No    Sexual activity: Not on file   Other Topics Concern    Not on file   Social History Narrative    Not on file     Social Determinants of Health     Financial Resource Strain: Not on file   Food Insecurity: Not on file   Transportation Needs: Not on file   Physical Activity: Not on file   Stress: Not on file   Social Connections: Not on file   Intimate Partner Violence: Not on file   Housing Stability: Not on file      Family History   Problem Relation Age of Onset    Cancer Mother     Cancer Father     Cancer Sister          ROS (10 systems)  In addition to that documented in the HPI above, the additional ROS was obtained:  Constitutional: Denies fevers or chills  Eyes: Denies vision changes  ENMT: Denies sore throat  CV: Denies chest pain  Resp: Denies SOB  GI: Denies vomiting or diarrhea  : Denies painful urination  MSK: Denies recent trauma  Skin: Denies new rashes  Neuro: Denies new numbness or tingling or weakness  Endocrine: Denies unexpected weight loss  Heme: Denies bleeding disorders    Physical Exam:         Wt Readings from Last 3 Encounters:   11/14/22 148 lb 2.4 oz (67.2 kg)   11/12/22 145 lb (65.8 kg)   07/28/22 151 lb 9.6 oz (68.8 kg)     Temp Readings from Last 3 Encounters:   11/14/22 97.7 °F (36.5 °C) (Oral)   07/24/22 97.8 °F (36.6 °C) (Oral)   03/22/22 96.1 °F (35.6 °C)     BP Readings from Last 3 Encounters:   11/14/22 (!) 159/58   07/28/22 (!) 142/70   07/24/22 (!) 161/90     Pulse Readings from Last 3 Encounters:   11/14/22 81   07/28/22 70   07/24/22 75        Gen: A&O x 4, NAD, cooperative  HEENT: NC/AT, EOMI, PERRL, mmm, neck supple, no meningeal signs; Heart: SB  Lungs: no distress  Ext: no edema, no calf tenderness b/l  Psych: normal mood and affect  Skin: no rashes or lesions    NEUROLOGIC EXAM:    Mental Status: A&O to self, location, month and year, NAD, speech clear, language fluent, repetition and naming intact, follows commands appropriately    Cranial Nerve Exam:   CN II-XII:  PERRL, VFF, no nystagmus, no gaze paresis, sensation V1-V3 intact b/l, muscles of facial expression asymmetric with left facial droop ; hearing intact to conversational tone, palate elevates symmetrically, shoulder elevation symmetric and tongue protrudes midline with movement side to side. Motor Exam:       Strength 5/5 UE's/LE's b/l  Tone and bulk normal   No pronator drift    Deep Tendon Reflexes: 2/4 biceps, brachioradialis, patellar, and achilles b/l; flexor plantar responses b/l    Sensation: Intact light touch/vibration UE's/LE's b/l, all decreased in stocking distribution     Coordination/Cerebellum:       Tremors--none      Rapidly alternating movements: no dysdiadochokinesia b/l               Finger-to-Nose: no dysmetria b/l    Gait and stance:      Gait: deferred      LABS:     Recent Labs     11/12/22  1030 11/13/22  0700 11/14/22  0650   WBC 8.1 7.4 10.4    138 141   K 3.8 3.9 3.9   CL 98* 101 104   CO2 24 25 24   BUN 15 14 13   CREATININE 0.7 0.7 0.7   GLUCOSE 127* 178* 176*   INR 0.88  --   --          Hemoglobin a1c 6.7    IMAGING:    MRI brain:  Acute lacune within the keith. The findings were sent to the Radiology Results [id number] at 6:07   pm on 11/12/2022 to be communicated to a licensed caregiver. CTA head and neck:  1. Decreased attenuation involving the right basal ganglia which may   represent an age indeterminate infarct. 2. Otherwise, no acute intracranial abnormality.    3. Mild global parenchymal volume loss with chronic microvascular ischemic changes. 4. No flow limiting stenosis seen of the cervical carotid/vertebral arteries. 5. Atherosclerosis contributes to stenosis involving the supraclinoid ICAs   bilaterally, mild on the right and moderate on the left. 6. Mild-to-moderate stenosis involving the A2/A3 segments of the anterior   cerebral arteries bilaterally. 7. Moderate stenosis involving the P1 segment of the right posterior cerebral   artery with moderate focal stenosis involving the right P2/P3 segment. 8. Mild stenosis involving the P1 segment of the left posterior cerebral   artery. ECHO      Summary   Left ventricular function and size is normal, EF is estimated at 55-60%. Mild left ventricular hypertrophy. Normal diastolic filling pattern for age. No regional wall motion abnormalities were detected. No significant valvular abnormalities. Mild mitral regurgitation is present. No evidence of pericardial effusion. ASSESSMENT/PLAN:     78-year-old female with reported left unilateral weakness numbness left facial droop with associated syncopal episodes secondary to acute right pontine infarct superimposed on symptomatic episodes of sinus bradycardia. Plan of care as follows:  Neuro exam:  Left facial droop   Neurodiagnostics:  MRI brain as above  CTA head and neck as above   Echo noncontributory  Medications:  DAPT x21 days, then continue on monotherapy with aspirin  Statin   PT/OT/ST:  Per their recommendations   Follow-up:  Cardiology for 30-day monitor  Neurology in 6 weeks with repeat MRI for stroke    Neurology will sign off at this time. Please contact us with any new concerns or neurologic changes. > 35 minutes of time spent included chart review, obtaining history, patient examination, developing plan of care, and documentation. Patient was discussed with attending neurologist Dr. Luisito King.     ANNETTE [place] 11/14/2022 7:18 PM  Neurology

## 2022-11-15 NOTE — DISCHARGE SUMMARY
Discharge Summary    Name:  Rajni Arroyo /Age/Sex: 1945  (65 y.o. female)   MRN & CSN:  [id number] & [id number] Admission Date/Time: 2022 10:17 AM   Attending:  No att. providers found Discharging Physician: Yaquelin Carson MD       Admission Diagnosis:   Strokelike symptoms  Non-insulin-dependent type 2 diabetes mellitus  Hypertension    Discharge Diagnosis:  Right pontine infarct  Sinus bradycardia  Non-insulin-dependent type 2 diabetes mellitus  Hypertension        Discharge Exam  Physical Exam  Constitutional:       Appearance: Normal appearance. HENT:      Head: Normocephalic and atraumatic. Mouth/Throat:      Mouth: Mucous membranes are moist.      Pharynx: Oropharynx is clear. Eyes:      Extraocular Movements: Extraocular movements intact. Pupils: Pupils are equal, round, and reactive to light. Cardiovascular:      Rate and Rhythm: Normal rate and regular rhythm. Pulses: Normal pulses. Heart sounds: Normal heart sounds. Pulmonary:      Effort: Pulmonary effort is normal.      Breath sounds: Normal breath sounds. Abdominal:      General: Abdomen is flat. Palpations: Abdomen is soft. Musculoskeletal:         General: Normal range of motion. Cervical back: Normal range of motion and neck supple. Skin:     General: Skin is warm and dry. Neurological:      General: No focal deficit present. Mental Status: She is alert and oriented to person, place, and time. Hospital Course:   Rajni Arroyo is a 65 y.o.  female  who presents with Stroke-like symptoms    Above patient presented to hospital on 2022 with complaints of left extremity weakness, dysarthria, left facial numbness with an onset of 4 days prior to coming. She underwent stroke protocol in the ED with CT head without contrast and CTA head and neck. Results are below mentioned in imaging section.   Brain MRI performed on 2022 was suggestive of acute lacune within the keith.  She was started on Plavix by neurology. She  Will complete DAPT for 21 days and will be on aspirin thereafter. Patient underwent echocardiogram while inpatient, was unremarkable. Patient required ICU stay due to 2 bradycardia episodes and brief unresponsiveness and hypotension. While in the ICU, patient did not have these episodes. She was evaluated by cardiology. 30-day event monitor was planned per cardiology. The patient expressed appropriate understanding of and agreement with the discharge recommendations, medications, and plan.      Consults this admission:  IP CONSULT TO STROKE TEAM  IP CONSULT TO NEUROLOGY  IP CONSULT TO CASE MANAGEMENT  IP CONSULT TO CARDIOLOGY  IP CONSULT TO ELECTROPHYSIOLOGY  IP CONSULT TO [id number]      Discharge Instruction:   Handoff to PCP:     Follow up appointments: cardiology ; neurology   Primary care physician:     Diet:  cardiac diet   Activity: activity as tolerated  Disposition: Discharged to:   [x]Home, [][facility], []SNF, []Acute Rehab, []Hospice   Condition on discharge: Stable    Discharge Medications:        Medication List        START taking these medications      atorvastatin 40 MG tablet  Commonly known as: LIPITOR  Take 1 tablet by mouth nightly     clopidogrel 75 MG tablet  Commonly known as: PLAVIX  Take 1 tablet by mouth daily for 17 doses  Start taking on: November 16, 2022     losartan 50 MG tablet  Commonly known as: COZAAR  Take 1 tablet by mouth daily  Start taking on: November 16, 2022  Replaces: irbesartan 300 MG tablet            CHANGE how you take these medications      amLODIPine 5 MG tablet  Commonly known as: NORVASC  Take 1 tablet by mouth daily  Start taking on: November 16, 2022  What changed:   medication strength  how much to take            CONTINUE taking these medications      aspirin 81 MG chewable tablet     Centrum Silver Adult 50+ Tabs     cholestyramine 4 g packet  Commonly known as: Questran  Take 1 packet by mouth in the morning and 1 packet before bedtime. FLUoxetine 10 MG capsule  Commonly known as: PROZAC  Take 1 capsule by mouth every morning     glimepiride 1 MG tablet  Commonly known as: AMARYL     metFORMIN 500 MG extended release tablet  Commonly known as: GLUCOPHAGE-XR  Take 1 tablet by mouth 2 times daily     Zinc 30 MG Caps            STOP taking these medications      chlorthalidone 25 MG tablet  Commonly known as: HYGROTON     irbesartan 300 MG tablet  Commonly known as: AVAPRO  Replaced by: losartan 50 MG tablet               Where to Get Your Medications        These medications were sent to [address] [place] [address] 25, [address] [facility] [id number]      Phone: [phone]   amLODIPine 5 MG tablet  atorvastatin 40 MG tablet  clopidogrel 75 MG tablet  losartan 50 MG tablet  metFORMIN 500 MG extended release tablet         Objective Findings at Discharge:       BMP/CBC  Recent Labs     11/13/22  0700 11/14/22  0650    141   K 3.9 3.9    104   CO2 25 24   BUN 14 13   CREATININE 0.7 0.7   WBC 7.4 10.4   HCT 38.5 38.6    340       IMAGING:       IMAGING:   MRI brain:  Acute lacune within the keith. The findings were sent to the Radiology Results [id number] at 6:07  pm on 11/12/2022 to be communicated to a licensed caregiver. CTA head and neck:  1. Decreased attenuation involving the right basal ganglia which may  represent an age indeterminate infarct. 2. Otherwise, no acute intracranial abnormality. 3. Mild global parenchymal volume loss with chronic microvascular ischemic  changes. 4. No flow limiting stenosis seen of the cervical carotid/vertebral arteries. 5. Atherosclerosis contributes to stenosis involving the supraclinoid ICAs  bilaterally, mild on the right and moderate on the left.   6. Mild-to-moderate stenosis involving the A2/A3 segments of the anterior  cerebral arteries bilaterally. 7. Moderate stenosis involving the P1 segment of the right posterior cerebral  artery with moderate focal stenosis involving the right P2/P3 segment. 8. Mild stenosis involving the P1 segment of the left posterior cerebral  artery. Additional Information: Patient seen and examined day of discharge.  For more information regarding patient's care please contact [facility] records [phone]    Discharge Time of 30 minutes    Electronically signed by Ambrosio Lewis MD on 11/15/2022 at 4:39 PM

## 2022-11-16 NOTE — CARE COORDINATION
Daughter Quincy Tracy called [facility] to set up. They are in [facility]. Advised [facility] doesn't go to [facility] and call PCP office to get a agency to start services there as they dont know when pt will return to [facility].

## 2022-11-17 ENCOUNTER — TELEPHONE (OUTPATIENT)
Dept: CARDIOLOGY CLINIC | Age: 77
End: 2022-11-17

## 2022-11-17 NOTE — TELEPHONE ENCOUNTER
Called ans spoke with  wife is away staying with her daughter will have wife call to set up event monitor and follow up

## 2022-12-02 ENCOUNTER — TELEPHONE (OUTPATIENT)
Dept: NEUROLOGY | Age: 77
End: 2022-12-02

## 2023-01-01 NOTE — TELEPHONE ENCOUNTER
Called Dr JOHNS [facility] office to check on pt's appt. Spoke with [employer] - she saw where I called for appt on 5/30/18 but no appt scheduled. While trying to reach Dr Jennifer Hanley she saw they had an opening on Monday 6/4/18 at 11:15 am. Pt will need to arrive by 11 am. Faxed office note from 5/30/18 and EKG done same day to [phone]. Confirmation received. Spoke with pt - advised of above appt. Pt dtr called with pt on speaker stating she would like to cancel hospital f/u and schedule with [facility]. She stated pt has recently moved to [facility] and it would be closer for her. Canceled ov scheduled for 12/12 and gave info for [facility] to call to schedule. Advised to call back with any issues. TRANSFER - OUT REPORT:    Verbal report given to [facility] (name) on Kobe Overall  being transferred to [address] [place] [address] (unit) for routine post - op       Report consisted of patients Situation, Background, Assessment and   Recommendations(SBAR). Information from the following report(s) SBAR, OR Summary, Procedure Summary, Intake/Output, MAR, and Cardiac Rhythm NSR  was reviewed with the receiving nurse. Opportunity for questions and clarification was provided.       Patient transported with:   O2 @ 2 liters

## 2024-05-02 NOTE — ED PROVIDER NOTES
Patient Identification  Emerald Mccarty is a 75 y.o. female    Chief Complaint  Hand Injury (R hand)      HPI  (History provided by patient)  This is a 75 y.o. female who was brought in by self for chief complaint of right hand pain. Onset was 2 days ago. Patient reports she was in a shop when a small plaque fell off the wall twice and struck her in the right hand. She reports the second time she sustained a small cut to her distal right middle finger. She states that time she did not have much pain, states that he woke up today and distal motor finger is bruised and entire right hand feels swollen. Reports she has pain when trying to make a fist and is unable to fully close her hand. She denies swelling anywhere else. She has not noticed any erythema. Denies fevers. She is diabetic. REVIEW OF SYSTEMS    Constitutional:  Denies fever, chills  Musculoskeletal:  Denies back pain.  + hand pain  Skin:  Denies rash  Neurologic:  Denies focal weakness, or sensory changes     See HPI and nursing notes for additional information     I have reviewed the following nursing documentation:  Allergies: Allergies   Allergen Reactions    Statins Other (See Comments)     Nausea, unsteady feeling, restless legs. Past medical history:  has a past medical history of Anxiety, Diabetes mellitus ([address] and Hypertension. Past surgical history:  has a past surgical history that includes [place] tooth extraction and Cholecystectomy. Home medications:   Prior to Admission medications    Medication Sig Start Date End Date Taking?  Authorizing Provider   cephALEXin (KEFLEX) 500 MG capsule Take 1 capsule by mouth 3 times daily for 7 days 4/21/19 4/28/19 Yes Andrew Matamoros PA-C   naproxen (NAPROSYN) 375 MG tablet Take 1 tablet by mouth 2 times daily as needed for Pain 4/21/19 5/1/19 Yes Andrew Matamoros PA-C   metFORMIN (GLUCOPHAGE-XR) 500 MG extended release tablet Take 2 tablets by mouth daily (with breakfast) And one in the evening. 2/20/19   Fred Ewing MD   irbesartan (AVAPRO) 300 MG tablet Take 1 tablet by mouth nightly 2/20/19   Fred Ewing MD   [facility]) 10 MG capsule Take 1 capsule by mouth every morning 2/20/19   Fred Ewing MD   diltiazem (CARDIZEM CD) 180 MG extended release capsule Take 1 capsule by mouth daily 1/2/19   Fred Ewing MD   hydrochlorothiazide (HYDRODIURIL) 25 MG tablet Take 1 tablet by mouth daily 8/2/18   Fred Ewing MD   aspirin 81 MG chewable tablet Take 81 mg by mouth daily    Historical Provider, MD   Multiple Vitamins-Minerals (CENTRUM SILVER ADULT 50+) TABS Take 1 tablet by mouth daily    Historical Provider, MD       Social history:  reports that she has never smoked. She has never used smokeless tobacco. She reports that she does not drink alcohol or use drugs. Family history:    Family History   Problem Relation Age of Onset    Cancer Mother     Cancer Father     Cancer Sister          Exam  BP (S) (!) 221/98 Comment: documented hypertension being treated by PCP  Pulse 83   Temp 98.4 °F (36.9 °C) (Oral)   Resp 16   Ht 5' (1.524 m)   Wt 145 lb (65.8 kg)   SpO2 100%   BMI 28.32 kg/m²   Nursing note and vitals reviewed. Constitutional: Well developed, well nourished. No acute distress. HENT:      Head: Normocephalic and atraumatic. Ears: External ears normal.      Nose: Nose normal.  Cardiovascular: Radial pulses 2+ on right. Pulmonary/Chest: Effort normal. No respiratory distress. Musculoskeletal: Moves all extremities. No gross deformity. There is trace edema noted throughout the right hand most prominently in the right middle finger. There is bruising noted over the middle finger pad and a 7-8 mm superficial laceration is over the distal fingertip. There is no nail involvement. There is no erythema. There is mild diffuse tenderness to palpation of the right hand only. No edema and the wrist or forearm.     Neurological: Alert and oriented to person, place, and time. Motor and sensation grossly intact. Normal muscle tone. Skin: Warm and dry. No rash. Good capillary refill noted in right fingers. Psychiatric: Normal mood and affect. Behavior is normal.    Procedures        Radiographs (if obtained):  [] The following radiograph was interpreted by myself in the absence of a radiologist:   [x] Radiologist's Report Reviewed:  XR HAND RIGHT (MIN 3 VIEWS)   Final Result   1. No acute osseous abnormality involving the right hand. Labs  No results found for this visit on 04/21/19. MDM  Patient presents today with chief complaint of hand pain after trauma 2 days ago. Physical exam revealed small laceration to distal middle finger, bruising of distal finger, minimal edema of right hand, no erythema. Workup included x-ray of right hand which revealed no acute injury. Unclear source of patient's symptoms. I believe this is likely traumatic in origin but I am concerned for underlying infection due to h/o DM and edema but has not warmth or redness. Will cover with keflex. Recommended cryotherapy, rest, nsaids for pain. Patient's blood pressure was very elevated here, she admits that she has not taken her blood pressure medications this morning, denies chest pain, shortness of breath, changes in urination, headaches. She is being treated for blood pressure by her family doctor. Recommended she take her blood pressure medicine when she gets home. I estimate there is LOW risk for COMPARTMENT SYNDROME, DEEP VENOUS THROMBOSIS, NECROTIZING FASCIITIS, CELLULITIS, MAJOR FRACTURE, OSTEOMYELITIS, SEPTIC ARTHRITIS, TENDON OR NEUROVASCULAR INJURY, thus I consider the discharge disposition reasonable.  Harvey Rhodes and I have discussed the diagnosis and risks, and we agree with discharging home to follow-up with their primary doctor or the referral orthopedist. We also discussed returning to the Emergency Department immediately I independently performed the documented:

## 2025-03-06 NOTE — PROGRESS NOTES
Patient used call light and when this nurse entered room patient stated 'i'm having that feeling again'. Patient states she feels dizzy. Patient was pale and diaphoretic. Patient's heart rate 37 on monitor. Full vital signs charted in Epic as situation unfolded. Patient able to answer questions appropriately for this nurse. Charge nurse alerted to change in patient status and requests made for EKG strips to be printed at desk. Patient became unresponsive and on monitor patient's heart rate was 34. Code blue activated and this nurse began to provide CPR as patient was unresponsive and no radial pulse was present. Charge nurse entered room and began to assist. Hospitalist at bedside at 8:05. Patient began to withdraw from painful stimuli of chest compressions after approximately 45 seconds of chest compressions. Patient states she does not remember any chest compressions and is asking why multiple staff members are in the room. Dr. Amara Houston from Cardiology at bedside to interview patient, consult with hospitalist and interpret rhythm strips. For closer monitoring patient will be transferred to ICU per decision by the hospitalist. Patient updated on plan of care. Family arrived prior to patient transfer to ICU and were updated on plan of care and new room assignment. Patient